# Patient Record
Sex: FEMALE | Race: WHITE | NOT HISPANIC OR LATINO | URBAN - METROPOLITAN AREA
[De-identification: names, ages, dates, MRNs, and addresses within clinical notes are randomized per-mention and may not be internally consistent; named-entity substitution may affect disease eponyms.]

---

## 2024-08-29 ENCOUNTER — LAB (OUTPATIENT)
Dept: LAB | Facility: LAB | Age: 39
End: 2024-08-29
Payer: COMMERCIAL

## 2024-08-29 ENCOUNTER — INITIAL PRENATAL (OUTPATIENT)
Dept: OBSTETRICS AND GYNECOLOGY | Facility: HOSPITAL | Age: 39
End: 2024-08-29
Payer: COMMERCIAL

## 2024-08-29 VITALS — WEIGHT: 151.4 LBS | SYSTOLIC BLOOD PRESSURE: 108 MMHG | BODY MASS INDEX: 21.72 KG/M2 | DIASTOLIC BLOOD PRESSURE: 68 MMHG

## 2024-08-29 DIAGNOSIS — Z34.81 PRENATAL CARE, SUBSEQUENT PREGNANCY IN FIRST TRIMESTER (HHS-HCC): ICD-10-CM

## 2024-08-29 DIAGNOSIS — Z12.4 CERVICAL CANCER SCREENING: ICD-10-CM

## 2024-08-29 DIAGNOSIS — Z12.4 CERVICAL CANCER SCREENING: Primary | ICD-10-CM

## 2024-08-29 DIAGNOSIS — Z3A.01 7 WEEKS GESTATION OF PREGNANCY (HHS-HCC): ICD-10-CM

## 2024-08-29 DIAGNOSIS — O21.9 NAUSEA AND VOMITING IN PREGNANCY PRIOR TO 22 WEEKS GESTATION (HHS-HCC): ICD-10-CM

## 2024-08-29 DIAGNOSIS — Z34.81 PRENATAL CARE, SUBSEQUENT PREGNANCY IN FIRST TRIMESTER (HHS-HCC): Primary | ICD-10-CM

## 2024-08-29 PROBLEM — Z78.9 NOT IMMUNE TO RUBELLA: Status: ACTIVE | Noted: 2024-08-29

## 2024-08-29 PROBLEM — O09.521 MULTIGRAVIDA OF ADVANCED MATERNAL AGE IN FIRST TRIMESTER (HHS-HCC): Status: ACTIVE | Noted: 2024-08-29

## 2024-08-29 PROBLEM — Z82.79 FAMILY HISTORY OF CONGENITAL HEART DEFECT: Status: ACTIVE | Noted: 2024-08-29

## 2024-08-29 LAB
ABO GROUP (TYPE) IN BLOOD: NORMAL
ANTIBODY SCREEN: NORMAL
ERYTHROCYTE [DISTWIDTH] IN BLOOD BY AUTOMATED COUNT: 12.4 % (ref 11.5–14.5)
EST. AVERAGE GLUCOSE BLD GHB EST-MCNC: 82 MG/DL
HBA1C MFR BLD: 4.5 %
HBV SURFACE AG SERPL QL IA: NONREACTIVE
HCT VFR BLD AUTO: 38.3 % (ref 36–46)
HCV AB SER QL: NONREACTIVE
HGB BLD-MCNC: 12.8 G/DL (ref 12–16)
HIV 1+2 AB+HIV1 P24 AG SERPL QL IA: NONREACTIVE
MCH RBC QN AUTO: 31.4 PG (ref 26–34)
MCHC RBC AUTO-ENTMCNC: 33.4 G/DL (ref 32–36)
MCV RBC AUTO: 94 FL (ref 80–100)
NRBC BLD-RTO: 0 /100 WBCS (ref 0–0)
PLATELET # BLD AUTO: 240 X10*3/UL (ref 150–450)
PREGNANCY TEST URINE, POC: POSITIVE
RBC # BLD AUTO: 4.07 X10*6/UL (ref 4–5.2)
REFLEX ADDED, ANEMIA PANEL: NORMAL
RH FACTOR (ANTIGEN D): NORMAL
RUBV IGG SERPL IA-ACNC: 0.4 IA
RUBV IGG SERPL QL IA: NEGATIVE
TREPONEMA PALLIDUM IGG+IGM AB [PRESENCE] IN SERUM OR PLASMA BY IMMUNOASSAY: NONREACTIVE
WBC # BLD AUTO: 7.7 X10*3/UL (ref 4.4–11.3)

## 2024-08-29 PROCEDURE — 83020 HEMOGLOBIN ELECTROPHORESIS: CPT

## 2024-08-29 PROCEDURE — 86317 IMMUNOASSAY INFECTIOUS AGENT: CPT

## 2024-08-29 PROCEDURE — 87340 HEPATITIS B SURFACE AG IA: CPT

## 2024-08-29 PROCEDURE — 87491 CHLMYD TRACH DNA AMP PROBE: CPT

## 2024-08-29 PROCEDURE — 83021 HEMOGLOBIN CHROMOTOGRAPHY: CPT

## 2024-08-29 PROCEDURE — 86901 BLOOD TYPING SEROLOGIC RH(D): CPT

## 2024-08-29 PROCEDURE — 86803 HEPATITIS C AB TEST: CPT

## 2024-08-29 PROCEDURE — 85027 COMPLETE CBC AUTOMATED: CPT

## 2024-08-29 PROCEDURE — 87389 HIV-1 AG W/HIV-1&-2 AB AG IA: CPT

## 2024-08-29 PROCEDURE — 87661 TRICHOMONAS VAGINALIS AMPLIF: CPT

## 2024-08-29 PROCEDURE — 87086 URINE CULTURE/COLONY COUNT: CPT

## 2024-08-29 PROCEDURE — 36415 COLL VENOUS BLD VENIPUNCTURE: CPT

## 2024-08-29 PROCEDURE — 86900 BLOOD TYPING SEROLOGIC ABO: CPT

## 2024-08-29 PROCEDURE — 86850 RBC ANTIBODY SCREEN: CPT

## 2024-08-29 PROCEDURE — 86780 TREPONEMA PALLIDUM: CPT

## 2024-08-29 PROCEDURE — 83036 HEMOGLOBIN GLYCOSYLATED A1C: CPT

## 2024-08-29 PROCEDURE — 81025 URINE PREGNANCY TEST: CPT

## 2024-08-29 PROCEDURE — 0500F INITIAL PRENATAL CARE VISIT: CPT

## 2024-08-29 SDOH — ECONOMIC STABILITY: FOOD INSECURITY: WITHIN THE PAST 12 MONTHS, YOU WORRIED THAT YOUR FOOD WOULD RUN OUT BEFORE YOU GOT MONEY TO BUY MORE.: NEVER TRUE

## 2024-08-29 SDOH — ECONOMIC STABILITY: FOOD INSECURITY: WITHIN THE PAST 12 MONTHS, THE FOOD YOU BOUGHT JUST DIDN'T LAST AND YOU DIDN'T HAVE MONEY TO GET MORE.: NEVER TRUE

## 2024-08-29 SDOH — ECONOMIC STABILITY: TRANSPORTATION INSECURITY
IN THE PAST 12 MONTHS, HAS THE LACK OF TRANSPORTATION KEPT YOU FROM MEDICAL APPOINTMENTS OR FROM GETTING MEDICATIONS?: NO

## 2024-08-29 SDOH — ECONOMIC STABILITY: TRANSPORTATION INSECURITY
IN THE PAST 12 MONTHS, HAS LACK OF TRANSPORTATION KEPT YOU FROM MEETINGS, WORK, OR FROM GETTING THINGS NEEDED FOR DAILY LIVING?: NO

## 2024-08-29 ASSESSMENT — ENCOUNTER SYMPTOMS
OCCASIONAL FEELINGS OF UNSTEADINESS: 0
DEPRESSION: 0
LOSS OF SENSATION IN FEET: 0

## 2024-08-29 ASSESSMENT — EDINBURGH POSTNATAL DEPRESSION SCALE (EPDS)
I HAVE BEEN SO UNHAPPY THAT I HAVE HAD DIFFICULTY SLEEPING: NOT AT ALL
I HAVE BLAMED MYSELF UNNECESSARILY WHEN THINGS WENT WRONG: NO, NEVER
THE THOUGHT OF HARMING MYSELF HAS OCCURRED TO ME: NEVER
I HAVE LOOKED FORWARD WITH ENJOYMENT TO THINGS: AS MUCH AS I EVER DID
I HAVE BEEN SO UNHAPPY THAT I HAVE BEEN CRYING: NO, NEVER
THINGS HAVE BEEN GETTING ON TOP OF ME: NO, I HAVE BEEN COPING AS WELL AS EVER
TOTAL SCORE: 2
I HAVE FELT SAD OR MISERABLE: NO, NOT AT ALL
I HAVE FELT SCARED OR PANICKY FOR NO GOOD REASON: NO, NOT AT ALL
I HAVE BEEN ABLE TO LAUGH AND SEE THE FUNNY SIDE OF THINGS: AS MUCH AS I ALWAYS COULD
I HAVE BEEN ANXIOUS OR WORRIED FOR NO GOOD REASON: YES, SOMETIMES

## 2024-08-29 ASSESSMENT — LIFESTYLE VARIABLES
HOW MANY STANDARD DRINKS CONTAINING ALCOHOL DO YOU HAVE ON A TYPICAL DAY: PATIENT DOES NOT DRINK
SKIP TO QUESTIONS 9-10: 1
HOW OFTEN DO YOU HAVE SIX OR MORE DRINKS ON ONE OCCASION: NEVER
AUDIT-C TOTAL SCORE: 0
HOW OFTEN DO YOU HAVE A DRINK CONTAINING ALCOHOL: NEVER

## 2024-08-29 NOTE — PROGRESS NOTES
"Assessment/Plan     Routine Prenatal Care  - Patient appropriate for and desires midwifery service  - Oriented to practice, including available collaboration and consulting with physicians.  - Discussed routine OB labs, including serum STI/HIV, CBC, blood type and screen.   - Education provided r/t nutrition, folic acid supplementation, dietary guidelines, exercise, travel, smoking, alcohol, caffeine, and drug use.  - Dating ultrasound ordered   - Pt counseled on genetic testing options and provided literature in the obstetric folder. First line screening options, including cffDNA and NT ultrasound, were discussed and offered.  Benefits, drawbacks, and limitations explained. Pt would like both. Orders placed.  - Warning s/s discussed and SAB precautions reviewed. Pt provided office phone number and instructed on when to call.    Nausea During Pregnancy  - Discussed lifestyle modifications including small frequent meals, keeping dry crackers at bedside to eat upon awakening  - Discussed complementary treatments including aubree products and P6 acupressure (\"SeaBands\")  - 25-50mg Vitamin B6 TID (available OTC)  - 12.5mg Unisom nightly at first, then increase to BID if unresolved (available OTC)    Constipation   - Discussed increasing fluid intake, exercise, and fiber in diet. Discussed supplementing dietary fiber with Metamucil 1-3 times daily, and/or using stool softener Colace 50mg BID, PRN.     Pre-pregnancy BMI 21.67   - Normal weight in Pregnancy - BMI 18.5-24.9; weight gain of 25-35lbs through healthy eating habits reviewed     ASA Prophylaxis  - Pt does not meet criteria for ASA prophylaxis..     Health Maintenance  - counseled on seatbelt use, continued bi-annual visits for dental care, and annual visits with PCP  - COVID: vaccinated; most recent booster dose unknown  - Flu: Not yet vaccinated for 3437-2516 season; pt will consider next visit    Cervical Cancer Screening  - PAP due; collected today  - ASCCP " guidelines reviewed with patient; next PAP due in 5 yrs if nml and HPV neg    F/U 4 weeks. Serum NOB labs at next visit. Review dating US at next visit.     TWAN Shetty-SELMA    Subjective     Jennifer Soares is a 39 y.o.  at 7w2d with a working estimated date of delivery of 4/15/2025, by Last Menstrual Period who presents for an initial prenatal visit. This pregnancy is planned.   Partner:  Tod  Employment: Works for nonprofit    Patient currently experiencing: Constipation, breast tenderness    LMP: 24; Prior menstrual cycle regular q 28-30 days.   Bleeding or cramping since LMP: yes - Pt endorses small amount of spotting on Friday, no subsequent bleeding   Taking prenatal vitamin: Yes  Ultrasound completed this pregnancy: No    OB History    Para Term  AB Living   2 1 1         SAB IAB Ectopic Multiple Live Births                  # Outcome Date GA Lbr Norberto/2nd Weight Sex Type Anes PTL Lv   2 Current            1 Term 22   3.629 kg F Vag-Spont EPI       [place]  Depression Scale Total: 2  Prior pregnancy complications:  None    Preeclampsia Risk  Moderate risk factors include: Age =/> 35. High risk factors include: None.    PAP History: last PAP unknown  Pt denies known history of abnormals     No past medical history on file.   History reviewed. No pertinent surgical history.   Social History     Tobacco Use    Smoking status: Former    Smokeless tobacco: Never   Vaping Use    Vaping status: Never Used   Substance Use Topics    Alcohol use: Not Currently    Drug use: Not Currently      Current Medications: PNV    Objective     /68   Wt 68.7 kg (151 lb 6.4 oz)   LMP 2024   BMI 21.72 kg/m²     Physical Exam  Vitals reviewed.   Constitutional:       General: She is not in acute distress.     Appearance: Normal appearance.   HENT:      Head: Normocephalic and atraumatic.   Cardiovascular:      Rate and Rhythm: Normal rate and regular rhythm.       Heart sounds: Normal heart sounds, S1 normal and S2 normal.   Pulmonary:      Effort: Pulmonary effort is normal.      Breath sounds: Normal breath sounds.   Abdominal:      General: Abdomen is flat.      Palpations: Abdomen is soft.      Tenderness: There is no abdominal tenderness.      Comments: Gravid uterus   Genitourinary:     General: Normal vulva.      Exam position: Lithotomy position.      Pubic Area: No rash.       Labia:         Right: No rash or lesion.         Left: No rash or lesion.       Urethra: No prolapse, urethral swelling or urethral lesion.      Vagina: Normal.      Cervix: No cervical motion tenderness, discharge, lesion or cervical bleeding.      Uterus: Enlarged. Not tender.       Comments: Uterine size appropriate for gestational age; cervix visually closed  Musculoskeletal:         General: Normal range of motion.      Cervical back: Normal range of motion.   Skin:     General: Skin is warm and dry.   Neurological:      Mental Status: She is alert and oriented to person, place, and time.   Psychiatric:         Mood and Affect: Mood normal.         Behavior: Behavior normal.         Thought Content: Thought content normal.         Judgment: Judgment normal.         Postpartum Depression: Low Risk  (2024)    [place]  Depression Scale     Last EPDS Total Score: 2     Last EPDS Self Harm Result: Never        Pregnancy Problems (from 24 to present)       Problem Noted Resolved    Family history of congenital heart defect 2024 by ASHANTI Shetty No    Priority:  Medium      Overview Signed 2024  9:29 AM by ASHANTI Shetty     FOB with history of heart defect, had surgery in childhood         7 weeks gestation of pregnancy ([facility]) 2024 by ASHANTI Shetty No    Priority:  Medium      Overview Addendum 2024  9:41 AM by ASHANTI Shetty     Desired provider in labor: [x] CNM  [] Physician  [x] Blood Products: [] Yes,  accepts [] No, needs counseling  [x] Initial BMI: 21.67   [] Prenatal Labs:   [] Cervical Cancer Screening up to date  [] Rh status:   [] Genetic Screening:    [] NT US: (11-13 wks)  [] Baby ASA (if indicated):  [] Pregnancy dated by:     [] Anatomy US: (19-20 wks)  [] Federal Sterilization consent signed (if indicated):  [] 1hr GCT at 24-28wks:  [] Rhogam (if indicated):   [] Fetal Surveillance (if indicated):  [] Tdap (27-32 wks, may be given up to 36 wks if initial window missed):   [] RSV (32-36 wks) (Sept. to end of Jan):   [] Flu Vaccine:    [] Breastfeeding:  [] Postpartum Birth control method:   [] GBS at 36 - 37 wks:  [x] 39 weeks discussion of IOL vs. Expectant management: Expectant management  [x] Mode of delivery ( anticipated ): Vaginal           Multigravida of advanced maternal age in first trimester ([facility]) 8/29/2024 by ASHANTI Shetty No    Priority:  Medium

## 2024-08-30 LAB
BACTERIA UR CULT: NORMAL
C TRACH RRNA SPEC QL NAA+PROBE: NEGATIVE
HEMOGLOBIN A2: 2.8 % (ref 2–3.5)
HEMOGLOBIN A: 96.8 % (ref 95.8–98)
HEMOGLOBIN F: 0.4 % (ref 0–2)
HEMOGLOBIN IDENTIFICATION INTERPRETATION: NORMAL
N GONORRHOEA DNA SPEC QL PROBE+SIG AMP: NEGATIVE
PATH REVIEW-HGB IDENTIFICATION: NORMAL
T VAGINALIS RRNA SPEC QL NAA+PROBE: NEGATIVE

## 2024-09-09 ENCOUNTER — TELEPHONE (OUTPATIENT)
Dept: OBSTETRICS AND GYNECOLOGY | Facility: CLINIC | Age: 39
End: 2024-09-09
Payer: COMMERCIAL

## 2024-09-09 NOTE — TELEPHONE ENCOUNTER
Jennifer Soares has an appointment as a transfer OB on 10/2/2024. Patient stated that she has been bleeding again and wanted to know if she could be seen sooner. Please advise.    Renetta Andrews MA

## 2024-09-13 ENCOUNTER — HOSPITAL ENCOUNTER (OUTPATIENT)
Dept: RADIOLOGY | Facility: CLINIC | Age: 39
Discharge: HOME | End: 2024-09-13
Payer: COMMERCIAL

## 2024-09-13 DIAGNOSIS — Z3A.01 7 WEEKS GESTATION OF PREGNANCY (HHS-HCC): ICD-10-CM

## 2024-09-13 DIAGNOSIS — Z34.81 PRENATAL CARE, SUBSEQUENT PREGNANCY IN FIRST TRIMESTER (HHS-HCC): ICD-10-CM

## 2024-09-13 DIAGNOSIS — Z12.4 CERVICAL CANCER SCREENING: ICD-10-CM

## 2024-09-13 PROCEDURE — 76801 OB US < 14 WKS SINGLE FETUS: CPT

## 2024-09-17 ENCOUNTER — TELEPHONE (OUTPATIENT)
Dept: OBSTETRICS AND GYNECOLOGY | Facility: CLINIC | Age: 39
End: 2024-09-17
Payer: COMMERCIAL

## 2024-09-17 NOTE — TELEPHONE ENCOUNTER
Patient called wanted to know if she could move her 10/2 appt to 10/16 at 915. She said something came up and need to change.Please advise next step.    Carisa Dickerson CNA

## 2024-09-27 ENCOUNTER — APPOINTMENT (OUTPATIENT)
Dept: RADIOLOGY | Facility: HOSPITAL | Age: 39
End: 2024-09-27
Payer: COMMERCIAL

## 2024-09-30 ENCOUNTER — APPOINTMENT (OUTPATIENT)
Dept: OBSTETRICS AND GYNECOLOGY | Facility: CLINIC | Age: 39
End: 2024-09-30
Payer: COMMERCIAL

## 2024-10-01 ENCOUNTER — APPOINTMENT (OUTPATIENT)
Dept: RADIOLOGY | Facility: CLINIC | Age: 39
End: 2024-10-01
Payer: COMMERCIAL

## 2024-10-02 ENCOUNTER — APPOINTMENT (OUTPATIENT)
Dept: OBSTETRICS AND GYNECOLOGY | Facility: CLINIC | Age: 39
End: 2024-10-02
Payer: COMMERCIAL

## 2024-10-09 ENCOUNTER — HOSPITAL ENCOUNTER (OUTPATIENT)
Dept: RADIOLOGY | Facility: CLINIC | Age: 39
Discharge: HOME | End: 2024-10-09
Payer: COMMERCIAL

## 2024-10-09 DIAGNOSIS — Z3A.01 7 WEEKS GESTATION OF PREGNANCY (HHS-HCC): ICD-10-CM

## 2024-10-09 DIAGNOSIS — Z34.81 PRENATAL CARE, SUBSEQUENT PREGNANCY IN FIRST TRIMESTER (HHS-HCC): ICD-10-CM

## 2024-10-09 DIAGNOSIS — Z12.4 CERVICAL CANCER SCREENING: ICD-10-CM

## 2024-10-09 PROCEDURE — 76813 OB US NUCHAL MEAS 1 GEST: CPT

## 2024-10-09 PROCEDURE — 76813 OB US NUCHAL MEAS 1 GEST: CPT | Performed by: STUDENT IN AN ORGANIZED HEALTH CARE EDUCATION/TRAINING PROGRAM

## 2024-10-15 PROBLEM — Z3A.14 14 WEEKS GESTATION OF PREGNANCY (HHS-HCC): Status: ACTIVE | Noted: 2024-08-29

## 2024-10-15 PROBLEM — O09.529 ANTEPARTUM MULTIGRAVIDA OF ADVANCED MATERNAL AGE (HHS-HCC): Status: ACTIVE | Noted: 2024-08-29

## 2024-10-15 NOTE — PROGRESS NOTES
Ob Follow-up    SUBJECTIVE      HPI: Jennifer Soares is a 39 y.o.  at 14w1d here for RPNV.  She denies vaginal bleeding, leakage of fluid, decreased fetal movements, and contractions.       OBJECTIVE  Visit Vitals  /67   Wt 69.9 kg (154 lb)   LMP 2024   BMI 22.10 kg/m²   OB Status Pregnant   Smoking Status Former   BSA 1.86 m²      See OB flowsheet      ASSESSMENT & PLAN    Jennifer Soares is a 39 y.o.  at 14w1d here for the following concerns we addressed today:    Problem List Items Addressed This Visit          Ob-Gyn Problems    Antepartum multigravida of advanced maternal age ([facility]) - Primary    Overview     NT wnl           Relevant Orders    Myriad Prequel Prenatal Screen    14 weeks gestation of pregnancy ([facility])    Overview     [x] Blood Products: [x] Yes, accepts [] No, needs counseling  [x] Initial BMI: 21.67   [x] Prenatal Labs: wnl except for rubella non immune status  [x] Cervical Cancer Screening up to date: next due 2029  [x] Rh status: positive  [] Genetic Screening:  cfDNA screen  [x] It's a surprise!  [x] NT US: (11-13 wks): 1.7mm  [x] Baby ASA:  Yes  [x] Pregnancy dated by: LMP = 9w3d scan    [] Anatomy US: (19-20 wks)  [] 1hr GCT at 24-28wks:  [] Fetal Surveillance (if indicated):  [] Tdap (27-32 wks, may be given up to 36 wks if initial window missed):   [] RSV (32-36 wks) (Sept. to end of ):   [x] Flu Vaccine: Done    [] Breastfeeding:  [] Postpartum Birth control method:   [] GBS at 36 - 37 wks:  [x] 39 weeks discussion of IOL vs. Expectant management: Hoping for spontaneous labor  [x] Mode of delivery ( anticipated ): Vaginal           Relevant Orders    Flu vaccine, trivalent, preservative free, age 6 months and greater (Fluarix/Fluzone/Flulaval)       Other    Not immune to rubella    Overview     Offer MMR postpartum         Family history of congenital heart defect    Overview     FOB with history of heart defect, had surgery in childhood              Orders  Placed This Encounter   Procedures    Flu vaccine, trivalent, preservative free, age 6 months and greater (Fluarix/Fluzone/Flulaval)    Myriad Prequel Prenatal Screen     Standing Status:   Future     Number of Occurrences:   1     Standing Expiration Date:   10/16/2025     Order Specific Question:   Patient Ethnicity     Answer:   Other/Mixed Caucasion     Order Specific Question:   Pregnant     Answer:   Yes     Order Specific Question:   Due Date     Answer:   4/15/2025     Order Specific Question:   First Pregnancy     Answer:   Yes     Order Specific Question:   Pregnancy type     Answer:   Fritz (or unknown)     Order Specific Question:   Common aneuploidy, chromosome 13, 18, 21 (Z36.0)     Answer:   Yes     Order Specific Question:   Include sex chromosome analysis     Answer:   No     Order Specific Question:   Microdeletions, fritz only     Answer:   No     Order Specific Question:   Expanded aneuploidy, fritz only     Answer:   No     Order Specific Question:   Clinical indications     Answer:   Advanced maternal age, Not 1st pregnancy O09.521, OO09.522, O09.523, O09.529     Order Specific Question:   Billing Information     Answer:   Bill to insurance - If possible, attach a copy of the patient's insurance card     Order Specific Question:   Relationship to Policy Owner     Answer:   Self     Order Specific Question:   Patient e-mail address     Answer:   [email or web address]     Order Specific Question:   Patient's phone number     Answer:   [phone]     Order Specific Question:   Authorized Representative     Answer:   By providing the below contact, I confirm that the patient has expressly consented to [employer] sharing the patients protected health information, including screening results and billing information, with the person listed upon request.      Has not had cfDNA screening done yet and DOES NOT want to know fetal sex(order put in by [facility] says YES to fetal sex).  New order placed  and will try to delete that order.  Anatomy US scheduled  RTC in 4 weeks      Jhony Rodriguez MD

## 2024-10-16 ENCOUNTER — APPOINTMENT (OUTPATIENT)
Dept: OBSTETRICS AND GYNECOLOGY | Facility: CLINIC | Age: 39
End: 2024-10-16
Payer: COMMERCIAL

## 2024-10-16 ENCOUNTER — APPOINTMENT (OUTPATIENT)
Dept: LAB | Facility: LAB | Age: 39
End: 2024-10-16
Payer: COMMERCIAL

## 2024-10-16 VITALS — DIASTOLIC BLOOD PRESSURE: 67 MMHG | BODY MASS INDEX: 22.1 KG/M2 | SYSTOLIC BLOOD PRESSURE: 100 MMHG | WEIGHT: 154 LBS

## 2024-10-16 DIAGNOSIS — Z3A.14 14 WEEKS GESTATION OF PREGNANCY (HHS-HCC): ICD-10-CM

## 2024-10-16 DIAGNOSIS — O09.529 ANTEPARTUM MULTIGRAVIDA OF ADVANCED MATERNAL AGE (HHS-HCC): Primary | ICD-10-CM

## 2024-10-16 DIAGNOSIS — Z78.9 NOT IMMUNE TO RUBELLA: ICD-10-CM

## 2024-10-16 DIAGNOSIS — Z82.79 FAMILY HISTORY OF CONGENITAL HEART DEFECT: ICD-10-CM

## 2024-10-30 LAB — COMMENTS - MP RESULT TYPE: NORMAL

## 2024-11-12 PROBLEM — Z3A.18 18 WEEKS GESTATION OF PREGNANCY (HHS-HCC): Status: ACTIVE | Noted: 2024-08-29

## 2024-11-13 NOTE — PROGRESS NOTES
Ob Follow-up    SUBJECTIVE      HPI: Jennifer Soares is a 39 y.o.  at 18w2d here for RPNV.  She denies vaginal bleeding, leakage of fluid, decreased fetal movements, and contractions.       OBJECTIVE  Visit Vitals  /70   Wt 71.2 kg (157 lb)   LMP 2024   BMI 22.53 kg/m²   OB Status Pregnant   Smoking Status Former   BSA 1.88 m²      See OB flowsheet      ASSESSMENT & PLAN    Jennifer Soares is a 39 y.o.  at 18w2d here for the following concerns we addressed today:    Problem List Items Addressed This Visit          Ob-Gyn Problems    [facility] - [facility]    Overview     NT wnl           18 weeks gestation of pregnancy ([facility])    Overview     [x] Blood Products: [x] Yes, accepts [] No, needs counseling  [x] Initial BMI: 21.67   [x] Prenatal Labs: wnl except for rubella non immune status  [x] Cervical Cancer Screening up to date: next due 2029  [x] Rh status: positive  [x] Genetic Screening:  rrcfDNA screen  [x] It's a surprise!  [x] NT US: (11-13 wks): 1.7mm  [x] Baby ASA:  Yes  [x] Pregnancy dated by: LMP = 9w3d scan    [] Anatomy US: (19-20 wks)  [] 1hr GCT at 24-28wks:  [] Fetal Surveillance (if indicated):  [] Tdap (27-32 wks, may be given up to 36 wks if initial window missed):   [] RSV (32-36 wks) (Sept. to end of ):   [x] Flu Vaccine: Done    [] Breastfeeding:  [] Postpartum Birth control method:   [] GBS at 36 - 37 wks:  [x] 39 weeks discussion of IOL vs. Expectant management: Hoping for spontaneous labor  [x] Mode of delivery ( anticipated ): Vaginal              Other    Not immune to rubella    Overview     Offer MMR postpartum         Family history of congenital heart defect    Overview     FOB with history of heart defect, had surgery in childhood              No orders of the defined types were placed in this encounter.     Anatomy US scheduled  RTC in 4 weeks      Jhony Rodriguez MD

## 2024-11-14 ENCOUNTER — APPOINTMENT (OUTPATIENT)
Dept: OBSTETRICS AND GYNECOLOGY | Facility: CLINIC | Age: 39
End: 2024-11-14
Payer: COMMERCIAL

## 2024-11-14 VITALS — SYSTOLIC BLOOD PRESSURE: 109 MMHG | BODY MASS INDEX: 22.53 KG/M2 | DIASTOLIC BLOOD PRESSURE: 70 MMHG | WEIGHT: 157 LBS

## 2024-11-14 DIAGNOSIS — O09.529 ANTEPARTUM MULTIGRAVIDA OF ADVANCED MATERNAL AGE (HHS-HCC): Primary | ICD-10-CM

## 2024-11-14 DIAGNOSIS — Z82.79 FAMILY HISTORY OF CONGENITAL HEART DEFECT: ICD-10-CM

## 2024-11-14 DIAGNOSIS — Z3A.18 18 WEEKS GESTATION OF PREGNANCY (HHS-HCC): ICD-10-CM

## 2024-11-14 DIAGNOSIS — Z78.9 NOT IMMUNE TO RUBELLA: ICD-10-CM

## 2024-11-14 PROCEDURE — 0501F PRENATAL FLOW SHEET: CPT | Performed by: OBSTETRICS & GYNECOLOGY

## 2024-11-19 ENCOUNTER — HOSPITAL ENCOUNTER (OUTPATIENT)
Dept: RADIOLOGY | Facility: CLINIC | Age: 39
Discharge: HOME | End: 2024-11-19
Payer: COMMERCIAL

## 2024-11-19 DIAGNOSIS — O09.529 AMA (ADVANCED MATERNAL AGE) MULTIGRAVIDA 35+ (HHS-HCC): ICD-10-CM

## 2024-11-19 DIAGNOSIS — Z3A.01 7 WEEKS GESTATION OF PREGNANCY (HHS-HCC): ICD-10-CM

## 2024-11-19 DIAGNOSIS — Z12.4 CERVICAL CANCER SCREENING: ICD-10-CM

## 2024-11-19 DIAGNOSIS — Z34.81 PRENATAL CARE, SUBSEQUENT PREGNANCY IN FIRST TRIMESTER (HHS-HCC): ICD-10-CM

## 2024-11-19 PROBLEM — Q27.0 SINGLE UMBILICAL ARTERY (HHS-HCC): Status: ACTIVE | Noted: 2024-11-19

## 2024-11-19 PROCEDURE — 76811 OB US DETAILED SNGL FETUS: CPT | Performed by: STUDENT IN AN ORGANIZED HEALTH CARE EDUCATION/TRAINING PROGRAM

## 2024-11-19 PROCEDURE — 76811 OB US DETAILED SNGL FETUS: CPT

## 2024-12-10 PROBLEM — Z3A.22 22 WEEKS GESTATION OF PREGNANCY (HHS-HCC): Status: ACTIVE | Noted: 2024-08-29

## 2024-12-10 NOTE — PROGRESS NOTES
Ob Follow-up    SUBJECTIVE      HPI: Jennifer Soares is a 39 y.o.  at 22w2d here for RPNV.  She denies vaginal bleeding, leakage of fluid, decreased fetal movements, and contractions.       OBJECTIVE  Visit Vitals  BP 98/68   Wt 73.9 kg (163 lb)   LMP 2024   BMI 23.39 kg/m²   OB Status Pregnant   Smoking Status Former   BSA 1.91 m²      See OB flowsheet      ASSESSMENT & PLAN    Jennifer Soares is a 39 y.o.  at 22w2d here for the following concerns we addressed today:    Problem List Items Addressed This Visit          Ob-Gyn Problems    [facility] - Primary    Overview     US for growth:  30w  36w         Antepartum multigravida of advanced maternal age ([facility])    Overview     NT wnl           22 weeks gestation of pregnancy ([facility])    Overview     [x] Blood Products: [x] Yes, accepts [] No, needs counseling  [x] Initial BMI: 21.67   [x] Prenatal Labs: wnl except for rubella non immune status  [x] Cervical Cancer Screening up to date: next due 2029  [x] Rh status: positive  [x] Genetic Screening:  rrcfDNA screen  [x] It's a surprise!  [x] NT US: (11-13 wks): 1.7mm  [x] Baby ASA:  Yes  [x] Pregnancy dated by: LMP = 9w3d scan    [x] Anatomy US: (19-20 wks) SUA otherwise normal  [] 1hr GCT at 24-28wks:  [] Fetal Surveillance (if indicated):  [] Tdap (27-32 wks, may be given up to 36 wks if initial window missed):   [] RSV (32-36 wks) (Sept. to end of ):   [x] Flu Vaccine: Done    [] Breastfeeding:  [] Postpartum Birth control method:   [] GBS at 36 - 37 wks:  [x] 39 weeks discussion of IOL vs. Expectant management: Hoping for spontaneous labor  [x] Mode of delivery ( anticipated ): Vaginal           Relevant Orders    Syphilis Screen with Reflex    CBC Anemia Panel With Reflex,Pregnancy    Glucose, 1 Hour Screen, Pregnancy       Other    Not immune to rubella    Overview     Offer MMR postpartum         Family history of congenital heart defect    Overview     FOB with  history of heart defect, had surgery in childhood  Fetal echo scheduled 12/26               GCT next visit  RTC in 4 weeks      Jhony Rodriguez MD

## 2024-12-12 ENCOUNTER — APPOINTMENT (OUTPATIENT)
Dept: OBSTETRICS AND GYNECOLOGY | Facility: CLINIC | Age: 39
End: 2024-12-12
Payer: COMMERCIAL

## 2024-12-12 VITALS — SYSTOLIC BLOOD PRESSURE: 98 MMHG | WEIGHT: 163 LBS | BODY MASS INDEX: 23.39 KG/M2 | DIASTOLIC BLOOD PRESSURE: 68 MMHG

## 2024-12-12 DIAGNOSIS — Z78.9 NOT IMMUNE TO RUBELLA: ICD-10-CM

## 2024-12-12 DIAGNOSIS — Q27.0 SINGLE UMBILICAL ARTERY (HHS-HCC): Primary | ICD-10-CM

## 2024-12-12 DIAGNOSIS — Z3A.22 22 WEEKS GESTATION OF PREGNANCY (HHS-HCC): ICD-10-CM

## 2024-12-12 DIAGNOSIS — O09.529 ANTEPARTUM MULTIGRAVIDA OF ADVANCED MATERNAL AGE (HHS-HCC): ICD-10-CM

## 2024-12-12 DIAGNOSIS — Z82.79 FAMILY HISTORY OF CONGENITAL HEART DEFECT: ICD-10-CM

## 2024-12-12 PROCEDURE — 0501F PRENATAL FLOW SHEET: CPT | Performed by: OBSTETRICS & GYNECOLOGY

## 2024-12-26 ENCOUNTER — APPOINTMENT (OUTPATIENT)
Dept: PEDIATRIC CARDIOLOGY | Facility: CLINIC | Age: 39
End: 2024-12-26
Payer: COMMERCIAL

## 2024-12-26 VITALS
OXYGEN SATURATION: 98 % | SYSTOLIC BLOOD PRESSURE: 92 MMHG | BODY MASS INDEX: 22.87 KG/M2 | WEIGHT: 163.36 LBS | HEIGHT: 71 IN | HEART RATE: 86 BPM | DIASTOLIC BLOOD PRESSURE: 64 MMHG

## 2024-12-26 DIAGNOSIS — O35.BXX0 ABNORMAL FETAL ECHOCARDIOGRAPHY AFFECTING ANTEPARTUM CARE OF MOTHER, SINGLE OR UNSPECIFIED FETUS (HHS-HCC): Primary | ICD-10-CM

## 2024-12-26 DIAGNOSIS — O35.8XX0 MATERNAL CARE FOR OTHER (SUSPECTED) FETAL ABNORMALITY AND DAMAGE, NOT APPLICABLE OR UNSPECIFIED (HHS-HCC): ICD-10-CM

## 2024-12-26 DIAGNOSIS — O28.3 ABNORMAL FETAL ULTRASOUND: ICD-10-CM

## 2024-12-26 LAB — BODY SURFACE AREA: 1.93 M2

## 2024-12-26 PROCEDURE — 76825 ECHO EXAM OF FETAL HEART: CPT | Performed by: PEDIATRICS

## 2024-12-26 PROCEDURE — 1036F TOBACCO NON-USER: CPT | Performed by: PEDIATRICS

## 2024-12-26 PROCEDURE — 76827 ECHO EXAM OF FETAL HEART: CPT | Performed by: PEDIATRICS

## 2024-12-26 PROCEDURE — 99204 OFFICE O/P NEW MOD 45 MIN: CPT | Performed by: PEDIATRICS

## 2024-12-26 PROCEDURE — 3008F BODY MASS INDEX DOCD: CPT | Performed by: PEDIATRICS

## 2024-12-26 PROCEDURE — 93325 DOPPLER ECHO COLOR FLOW MAPG: CPT | Performed by: PEDIATRICS

## 2024-12-26 NOTE — PROGRESS NOTES
Jennifer Soares was seen at the request of Ina Dang for a chief complaint of family history of congenital heart disease; a report with my findings is being sent via written or electronic means the referring physician with my recommendations for treatment.     I had the pleasure of seeing Jennifer Soares in Pediatric Cardiology consultation at our [facility] location as part of our prenatal heart program for family history of congenital heart disease. Her  has a history of coarctation of the aorta and a bicuspid aortic valve. She is a 39 y.o. year-old  woman, currently 24w2d weeks gestation.  Her last menstrual period was Patient's last menstrual period was 2024..  Estimated date of delivery is Estimated Date of Delivery: 4/15/25.  There have been no pregnancy complications.   She has not been hospitalized during this pregnancy.  She had a NIPT, which was normal.  She had a second trimester ultrasound which demonstrated a single umbilical artery.      Prior to the visit, I personally reviewed the cardiac portions of the obstetrical ultrasound performed on 2024.  There is normal segmental anatomy with normal 4 chamber, outflow tract and 3 vessel views.      Her previous obstetrical history is significant for one term delivery.  Her past medical history is without complication.  She has no history of congenital heart disease, arrhythmia, cardiomyopathy, hypercholesterolemia, hypertension, diabetes, rheumatic heart disease, cancer, asthma, lupus, Sjogren syndrome, clotting disorder, depression, anxiety, alcohol abuse, phenylketonuria, or DiGeorge.  She has had no surgeries.  She is not taking any medications.  She has is allergic to penicillins..  She is currently taking prenatal vitamins.      Her family history is negative for congenital heart disease, early atherosclerosis, sudden cardiac death, long QT syndrome, cardiomyopathy, aortic aneurysm, or genetic or metabolic disease.    Her  " has a history of coarctation of the aorta and bicuspid aortic valve. He underwent surgical repair at 9 days old and balloon angioplasty at age 10.     She currently lives with her spouse and is .  She is not employed.  She does not smoke.  She denies illicit drug use or alcohol abuse.  She denies verbal, sexual, or physical abuse.     Delivery Hospital: [facility]  Father of the baby's name: Asad     BP 92/64 (BP Location: Left arm, Patient Position: Sitting, BP Cuff Size: Adult)   Pulse 86   Ht 1.81 m (5' 11.26\")   Wt 74.1 kg (163 lb 5.8 oz)   LMP 2024   SpO2 98%   BMI 22.62 kg/m²     She was resting comfortably in the examination room and alert, active and in no respiratory distress. Skin was without rash.  HEENT: moist mucous membranes, no JVD, goiter. Breathing is not labored.  She was acyanotic.  There was no peripheral edema.   The abdomen was gravid, soft, nontender with normal bowel sounds.  The liver was not palpable.  The spleen tip was not palpable.  She had a normal gait and normal strength in all extremities.  Cranial nerves II - XII are intact.  She had no clubbing, cyanosis, or edema.    A two-dimensional and Doppler fetal echocardiogram was performed today and interpreted by me at 24w2d weeks gestation.  The fetal echocardiogram showed normal segmental anatomy with no structural abnormalities found.  There is normal cardiac function.  There is no evidence of septation defect, right or left ventricular outflow obstruction or significant valvular regurgitation.  The fetal heart rate was within normal limits without ectopy or arrhythmia seen.  The spectral Doppler pattern across all valves, venous structures, and arterial structures was within normal limits.  There is no pericardial effusion.  Please see full report for details.    In summary, Jennifer Soares is a 39 y.o. year-old  woman, currently 24w2d weeks gestation, who had a normal " fetal echocardiogram at today's visit.  Therefore, we did not make any changes to her current delivery plan.  We did not prescribe any medications.  We did not recommend intervention.  She does not necessarily need to follow up with pediatric cardiology after the baby is born unless the pediatric team has any concerns or worries.     I do recommend that all first degree relatives of individuals with a bicuspid aortic valve have a screening echocardiogram.  This can be performed around 5 years of age.    LOC 0  No changes were made to current delivery plan. Triage code 0:  Delivery per OB at patient's preferred hospital.  Standard  care per  team.  Cardiology consult not necessary, unless there are clinical concerns.      Thank you for allowing me to participate in Jennifer's care.  If you have any further questions, please do not hesitate to contact me.     Demetri Jackson M.D.  [facility], Director  Ambulatory Pediatric Cardiology   Division of Pediatric Cardiology  [facility]  [facility]   of Pediatrics, [facility] [employer]  [facility] [address] [place] [address]  [place] [id number]  Office:  [phone]  Fax:       [phone]  e-mail:  [email or web address]    I spent greater than 45 minutes in performance of this consultation, of which greater than 50% was related to coordination of care or counseling.

## 2024-12-26 NOTE — LETTER
2024     Ina Dang MD  [id number]  Department Of Ob/Gyn-General Ob/Gyn  [facility] [address]    Patient: Jennifer Soares   YOB: 1985   Date of Visit: 2024       Dear Dr. Ina Dang MD:    Thank you for referring Jennifer Soares to me for evaluation. Below are my notes for this consultation.  If you have questions, please do not hesitate to call me. I look forward to following your patient along with you.       Sincerely,     Demetri Jackson MD      CC: MD Charissa Green, APRN-CN, [facility]  Eva Titus, APRN-CNP  ______________________________________________________________________________________    Jennifer Soares was seen at the request of Ina Dang for a chief complaint of family history of congenital heart disease; a report with my findings is being sent via written or electronic means the referring physician with my recommendations for treatment.     I had the pleasure of seeing Jennifer Soares in Pediatric Cardiology consultation at our [facility] location as part of our prenatal heart program for family history of congenital heart disease. Her  has a history of coarctation of the aorta and a bicuspid aortic valve. She is a 39 y.o. year-old  woman, currently 24w2d weeks gestation.  Her last menstrual period was Patient's last menstrual period was 2024..  Estimated date of delivery is Estimated Date of Delivery: 4/15/25.  There have been no pregnancy complications.   She has not been hospitalized during this pregnancy.  She had a NIPT, which was normal.  She had a second trimester ultrasound which demonstrated a single umbilical artery.      Prior to the visit, I personally reviewed the cardiac portions of the obstetrical ultrasound performed on 2024.  There is normal segmental anatomy with normal 4 chamber, outflow tract and 3 vessel views.      Her previous obstetrical history is significant for one term delivery.  " Her past medical history is without complication.  She has no history of congenital heart disease, arrhythmia, cardiomyopathy, hypercholesterolemia, hypertension, diabetes, rheumatic heart disease, cancer, asthma, lupus, Sjogren syndrome, clotting disorder, depression, anxiety, alcohol abuse, phenylketonuria, or DiGeorge.  She has had no surgeries.  She is not taking any medications.  She has is allergic to penicillins..  She is currently taking prenatal vitamins.      Her family history is negative for congenital heart disease, early atherosclerosis, sudden cardiac death, long QT syndrome, cardiomyopathy, aortic aneurysm, or genetic or metabolic disease.    Her  has a history of coarctation of the aorta and bicuspid aortic valve. He underwent surgical repair at 9 days old and balloon angioplasty at age 10.     She currently lives with her spouse and is .  She is not employed.  She does not smoke.  She denies illicit drug use or alcohol abuse.  She denies verbal, sexual, or physical abuse.     Delivery Hospital: [facility]  Father of the baby's name: Asad     BP 92/64 (BP Location: Left arm, Patient Position: Sitting, BP Cuff Size: Adult)   Pulse 86   Ht 1.81 m (5' 11.26\")   Wt 74.1 kg (163 lb 5.8 oz)   LMP 07/09/2024   SpO2 98%   BMI 22.62 kg/m²     She was resting comfortably in the examination room and alert, active and in no respiratory distress. Skin was without rash.  HEENT: moist mucous membranes, no JVD, goiter. Breathing is not labored.  She was acyanotic.  There was no peripheral edema.   The abdomen was gravid, soft, nontender with normal bowel sounds.  The liver was not palpable.  The spleen tip was not palpable.  She had a normal gait and normal strength in all extremities.  Cranial nerves II - XII are intact.  She had no clubbing, cyanosis, or edema.    A two-dimensional and Doppler fetal echocardiogram was performed today and interpreted by me at " 24w2d weeks gestation.  The fetal echocardiogram showed normal segmental anatomy with no structural abnormalities found.  There is normal cardiac function.  There is no evidence of septation defect, right or left ventricular outflow obstruction or significant valvular regurgitation.  The fetal heart rate was within normal limits without ectopy or arrhythmia seen.  The spectral Doppler pattern across all valves, venous structures, and arterial structures was within normal limits.  There is no pericardial effusion.  Please see full report for details.    In summary, Jennifer Soares is a 39 y.o. year-old  woman, currently 24w2d weeks gestation, who had a normal fetal echocardiogram at today's visit.  Therefore, we did not make any changes to her current delivery plan.  We did not prescribe any medications.  We did not recommend intervention.  She does not necessarily need to follow up with pediatric cardiology after the baby is born unless the pediatric team has any concerns or worries.     I do recommend that all first degree relatives of individuals with a bicuspid aortic valve have a screening echocardiogram.  This can be performed around 5 years of age.    LOC 0  No changes were made to current delivery plan. Triage code 0:  Delivery per OB at patient's preferred hospital.  Standard  care per  team.  Cardiology consult not necessary, unless there are clinical concerns.      Thank you for allowing me to participate in Jennifer's care.  If you have any further questions, please do not hesitate to contact me.     Demetri Jackson M.D.  [facility], Director  Ambulatory Pediatric Cardiology   Division of Pediatric Cardiology  [facility]  [facility]   of Pediatrics, [employer]  [facility] [address]  [place]  [address]  Office:  [phone]  Fax:       [phone]  e-mail:  [email or web address]    I spent greater than 45 minutes in performance of this consultation, of which greater than 50% was related to coordination of care or counseling.

## 2024-12-26 NOTE — PATIENT INSTRUCTIONS
The fetal echocardiogram performed today was normal.  The heart is built normally.  The heart function is normal.  The heart beat is normal.  There is no need for changes to your delivery plan.  Your baby does not need to see pediatric cardiology unless your pediatrician has concerns.  Sometimes it is not possible to see all the heart structures because of the position or size of the fetus. This does not mean they are not there, but may mean that for technical reasons they cannot be assessed. Sometimes this information may not be important; while in some cases it means that definite answers are not possible. The echocardiographer will discuss this with you if necessary, and repeat studies are frequently performed later in the pregnancy.     We see bicuspid aortic valves passed on in families.  I do recommend a screening echocardiogram at the age of 5 years.      Certain congenital heart abnormalities are also hard to detect by fetal echocardiograms, but often these are simple abnormalities. We do not mention this to concern you, but rather that you understand that there are technical limitations to such studies. It remains important that your pediatrician provides a normal careful medical examination of the baby (including the heart) takes place after birth, and if there were any suspicious cardiac findings, that these are evaluated in the usual way irrespective of the findings at fetal study.     Certain communications between the two sides of the circulation are normally present in all developing babies and normally close after birth. We are not able to tell in advance whether this will occur, however there is only a tiny chance that they will not. Persistence of these structures is generally not a difficult problem to deal with.     We direct our attention only to the heart, where we have special expertise. This is not the same as your general obstetric ultrasound scan. Other ultrasound information about the  fetus can be obtained from an obstetric ultrasonographer or your obstetrician.

## 2025-01-08 PROBLEM — Z3A.26 26 WEEKS GESTATION OF PREGNANCY (HHS-HCC): Status: ACTIVE | Noted: 2024-08-29

## 2025-01-09 ENCOUNTER — APPOINTMENT (OUTPATIENT)
Dept: OBSTETRICS AND GYNECOLOGY | Facility: CLINIC | Age: 40
End: 2025-01-09
Payer: COMMERCIAL

## 2025-01-09 VITALS — BODY MASS INDEX: 22.98 KG/M2 | DIASTOLIC BLOOD PRESSURE: 61 MMHG | WEIGHT: 166 LBS | SYSTOLIC BLOOD PRESSURE: 97 MMHG

## 2025-01-09 DIAGNOSIS — Z82.79 FAMILY HISTORY OF CONGENITAL HEART DEFECT: ICD-10-CM

## 2025-01-09 DIAGNOSIS — Z78.9 NOT IMMUNE TO RUBELLA: ICD-10-CM

## 2025-01-09 DIAGNOSIS — Z3A.26 26 WEEKS GESTATION OF PREGNANCY (HHS-HCC): ICD-10-CM

## 2025-01-09 DIAGNOSIS — Q27.0 SINGLE UMBILICAL ARTERY (HHS-HCC): Primary | ICD-10-CM

## 2025-01-09 DIAGNOSIS — K21.9 GASTROESOPHAGEAL REFLUX DISEASE WITHOUT ESOPHAGITIS: ICD-10-CM

## 2025-01-09 DIAGNOSIS — O09.529 ANTEPARTUM MULTIGRAVIDA OF ADVANCED MATERNAL AGE (HHS-HCC): ICD-10-CM

## 2025-01-09 PROCEDURE — 82947 ASSAY GLUCOSE BLOOD QUANT: CPT | Mod: PORLAB | Performed by: OBSTETRICS & GYNECOLOGY

## 2025-01-09 PROCEDURE — 36415 COLL VENOUS BLD VENIPUNCTURE: CPT | Performed by: OBSTETRICS & GYNECOLOGY

## 2025-01-09 PROCEDURE — 0501F PRENATAL FLOW SHEET: CPT | Performed by: OBSTETRICS & GYNECOLOGY

## 2025-01-09 PROCEDURE — 85027 COMPLETE CBC AUTOMATED: CPT | Mod: PORLAB | Performed by: OBSTETRICS & GYNECOLOGY

## 2025-01-09 PROCEDURE — 86780 TREPONEMA PALLIDUM: CPT | Performed by: OBSTETRICS & GYNECOLOGY

## 2025-01-09 RX ORDER — OMEPRAZOLE 20 MG/1
20 CAPSULE, DELAYED RELEASE ORAL DAILY
Qty: 90 CAPSULE | Refills: 3 | Status: SHIPPED | OUTPATIENT
Start: 2025-01-09 | End: 2026-01-09

## 2025-01-09 ASSESSMENT — EDINBURGH POSTNATAL DEPRESSION SCALE (EPDS)
I HAVE FELT SAD OR MISERABLE: NO, NOT AT ALL
TOTAL SCORE: 0
THE THOUGHT OF HARMING MYSELF HAS OCCURRED TO ME: NEVER
I HAVE LOOKED FORWARD WITH ENJOYMENT TO THINGS: AS MUCH AS I EVER DID
I HAVE BLAMED MYSELF UNNECESSARILY WHEN THINGS WENT WRONG: NO, NEVER
I HAVE BEEN ANXIOUS OR WORRIED FOR NO GOOD REASON: NO, NOT AT ALL
I HAVE FELT SCARED OR PANICKY FOR NO GOOD REASON: NO, NOT AT ALL
THINGS HAVE BEEN GETTING ON TOP OF ME: NO, I HAVE BEEN COPING AS WELL AS EVER
I HAVE BEEN SO UNHAPPY THAT I HAVE HAD DIFFICULTY SLEEPING: NOT AT ALL
I HAVE BEEN ABLE TO LAUGH AND SEE THE FUNNY SIDE OF THINGS: AS MUCH AS I ALWAYS COULD
I HAVE BEEN SO UNHAPPY THAT I HAVE BEEN CRYING: NO, NEVER

## 2025-01-09 NOTE — PROGRESS NOTES
Ob Follow-up    SUBJECTIVE      HPI: Jennifer Soares is a 39 y.o.  at 26w2d here for RPNV.  She denies vaginal bleeding, leakage of fluid, decreased fetal movements, and contractions.    OBJECTIVE  Visit Vitals  BP 97/61   Wt 75.3 kg (166 lb)   LMP 2024   BMI 22.98 kg/m²   OB Status Pregnant   Smoking Status Former   BSA 1.95 m²      See OB flowsheet      ASSESSMENT & PLAN    Jennifer Soares is a 39 y.o.  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  [x] NT US: (11-13 wks): 1.7mm  [x] Baby ASA:  Yes  [x] Pregnancy dated by: LMP = 9w3d scan    [x] Anatomy US: (19-20 wks) SUA otherwise normal  [] 1hr GCT at 24-28wks:  [] Fetal Surveillance (if indicated):  [] Tdap (27-32 wks, may be given up to 36 wks if initial window missed):   [] RSV (32-36 wks) (Sept. to end of ):   [x] Flu Vaccine: Done    [] Breastfeeding:  [] Postpartum Birth control method:   [] GBS at 36 - 37 wks:  [x] 39 weeks discussion of IOL vs. Expectant management: Hoping for spontaneous labor  [x] Mode of delivery ( anticipated ): Vaginal              Other    Not immune to rubella    Overview     Offer MMR postpartum         Gastroesophageal reflux disease without esophagitis    Relevant Medications    omeprazole (PriLOSEC) 20 mg DR capsule    Family history of  congenital heart defect    Overview     FOB with history of heart defect, had surgery in childhood  Fetal echo normal              GCT today  tDap next visit  Start miralax for constipation  RTC in 4 weeks      Jhony Rodriguez MD

## 2025-01-10 LAB
ERYTHROCYTE [DISTWIDTH] IN BLOOD BY AUTOMATED COUNT: 14 % (ref 11.5–14.5)
GLUCOSE 1H P 50 G GLC PO SERPL-MCNC: 77 MG/DL
HCT VFR BLD AUTO: 35 % (ref 36–46)
HGB BLD-MCNC: 11.1 G/DL (ref 12–16)
MCH RBC QN AUTO: 32.4 PG (ref 26–34)
MCHC RBC AUTO-ENTMCNC: 31.7 G/DL (ref 32–36)
MCV RBC AUTO: 102 FL (ref 80–100)
NRBC BLD-RTO: 0 /100 WBCS (ref 0–0)
PLATELET # BLD AUTO: 228 X10*3/UL (ref 150–450)
RBC # BLD AUTO: 3.43 X10*6/UL (ref 4–5.2)
REFLEX ADDED, ANEMIA PANEL: NORMAL
TREPONEMA PALLIDUM IGG+IGM AB [PRESENCE] IN SERUM OR PLASMA BY IMMUNOASSAY: NONREACTIVE
WBC # BLD AUTO: 7.8 X10*3/UL (ref 4.4–11.3)

## 2025-01-31 ENCOUNTER — HOSPITAL ENCOUNTER (OUTPATIENT)
Dept: RADIOLOGY | Facility: CLINIC | Age: 40
Discharge: HOME | End: 2025-01-31
Payer: COMMERCIAL

## 2025-01-31 DIAGNOSIS — Z3A.01 7 WEEKS GESTATION OF PREGNANCY (HHS-HCC): ICD-10-CM

## 2025-01-31 DIAGNOSIS — Z12.4 CERVICAL CANCER SCREENING: ICD-10-CM

## 2025-01-31 DIAGNOSIS — Z34.81 PRENATAL CARE, SUBSEQUENT PREGNANCY IN FIRST TRIMESTER (HHS-HCC): ICD-10-CM

## 2025-01-31 PROCEDURE — 76816 OB US FOLLOW-UP PER FETUS: CPT

## 2025-01-31 PROCEDURE — 76819 FETAL BIOPHYS PROFIL W/O NST: CPT

## 2025-02-03 PROBLEM — Z34.90 ENCOUNTER FOR PREGNANCY RELATED EXAMINATION, ANTEPARTUM: Status: ACTIVE | Noted: 2024-08-29

## 2025-02-03 NOTE — PROGRESS NOTES
Ob Follow-up    SUBJECTIVE    HPI: Jennifer Soares is a 39 y.o.  at 30w2d here for RPNV.  She denies vaginal bleeding, leakage of fluid, decreased fetal movements, and contractions.    OBJECTIVE  Visit Vitals  /67   Wt 78 kg (172 lb)   LMP 2024   BMI 23.81 kg/m²   OB Status Pregnant   Smoking Status Former   BSA 1.98 m²      See OB flowsheet    ASSESSMENT & PLAN    Jennifer Soares is a 39 y.o.  at 30w2d here for the following concerns we addressed today:    Problem List Items Addressed This Visit          Ob-Gyn Problems    Single umbilical artery ([facility]) - Primary    Overview     US for growth:  Amenable to one US in 3rd trimester, but unsure about two  30w EFW 64%, normal interval growth         Encounter for pregnancy related examination, antepartum    Overview     [x] Blood Products: [x] Yes, accepts [] No, needs counseling  [x] Initial BMI: 21.67   [x] Prenatal Labs: wnl except for rubella non immune status  [x] Cervical Cancer Screening up to date: next due 2029  [x] Rh status: positive  [x] Genetic Screening:  rrcfDNA screen  [x] It's a surprise!  [x] NT US: (11-13 wks): 1.7mm  [x] Baby ASA:  Yes  [x] Pregnancy dated by: LMP = 9w3d scan    [x] Anatomy US: (19-20 wks) SUA otherwise normal  [x] 1hr GCT at 24-28wks: Normal  [] Fetal Surveillance (if indicated):  [x] Tdap: Done  [] RSV (32-36 wks) (Sept. to end ):   [x] Flu Vaccine: Done    [] Breastfeeding:  [] Postpartum Birth control method:   [] GBS at 36 - 37 wks:  [x] 39 weeks discussion of IOL vs. Expectant management: Hoping for spontaneous labor  [x] Mode of delivery ( anticipated ): Vaginal           Antepartum multigravida of advanced maternal age ([facility])    Overview     NT wnl              Other    Not immune to rubella    Overview     Offer MMR postpartum         Gastroesophageal reflux disease without esophagitis    Family history of congenital heart defect    Overview     FOB with history of heart defect, had surgery  in childhood  Fetal echo normal            tDap today  Constipation and GERD improved  RTC in 2 weeks    Jhony Rodriguez MD

## 2025-02-06 ENCOUNTER — APPOINTMENT (OUTPATIENT)
Dept: OBSTETRICS AND GYNECOLOGY | Facility: CLINIC | Age: 40
End: 2025-02-06
Payer: COMMERCIAL

## 2025-02-06 VITALS — WEIGHT: 172 LBS | DIASTOLIC BLOOD PRESSURE: 67 MMHG | SYSTOLIC BLOOD PRESSURE: 107 MMHG | BODY MASS INDEX: 23.81 KG/M2

## 2025-02-06 DIAGNOSIS — Z34.90 ENCOUNTER FOR PREGNANCY RELATED EXAMINATION, ANTEPARTUM: ICD-10-CM

## 2025-02-06 DIAGNOSIS — Z78.9 NOT IMMUNE TO RUBELLA: ICD-10-CM

## 2025-02-06 DIAGNOSIS — K21.9 GASTROESOPHAGEAL REFLUX DISEASE WITHOUT ESOPHAGITIS: ICD-10-CM

## 2025-02-06 DIAGNOSIS — O09.529 ANTEPARTUM MULTIGRAVIDA OF ADVANCED MATERNAL AGE (HHS-HCC): ICD-10-CM

## 2025-02-06 DIAGNOSIS — Q27.0 SINGLE UMBILICAL ARTERY (HHS-HCC): Primary | ICD-10-CM

## 2025-02-06 DIAGNOSIS — Z82.79 FAMILY HISTORY OF CONGENITAL HEART DEFECT: ICD-10-CM

## 2025-02-19 NOTE — PROGRESS NOTES
Ob Follow-up    SUBJECTIVE    HPI: Jennifer Soares is a 39 y.o.  at 32w2d here for RPNV.  She denies vaginal bleeding, leakage of fluid, decreased fetal movements, and contractions.    OBJECTIVE  Visit Vitals  /72   Wt 78.9 kg (174 lb)   LMP 2024   BMI 24.09 kg/m²   OB Status Pregnant   Smoking Status Former   BSA 1.99 m²      See OB flowsheet    ASSESSMENT & PLAN    Jennifer Soares is a 39 y.o.  at 32w2d here for the following concerns we addressed today:    Problem List Items Addressed This Visit          Ob-Gyn Problems    Single umbilical artery ([facility]) - Primary    Overview     US for growth:  Amenable to one US in 3rd trimester, but unsure about two  30w EFW 64%, normal interval growth         Encounter for pregnancy related examination, antepartum    Overview     [x] Blood Products: [x] Yes, accepts [] No, needs counseling  [x] Initial BMI: 21.67   [x] Prenatal Labs: wnl except for rubella non immune status  [x] Cervical Cancer Screening up to date: next due 2029  [x] Rh status: positive  [x] Genetic Screening:  rrcfDNA screen  [x] It's a surprise!  [x] NT US: (11-13 wks): 1.7mm  [x] Baby ASA:  Yes  [x] Pregnancy dated by: LMP = 9w3d scan    [x] Anatomy US: (19-20 wks) SUA otherwise normal  [x] 1hr GCT at 24-28wks: Normal  [x] Tdap: Done  [] RSV (32-36 wks) (Sept. to end ):   [x] Flu Vaccine: Done    [] Breastfeeding:  [] Postpartum Birth control method:   [] GBS at 36 - 37 wks:  [x] 39 weeks discussion of IOL vs. Expectant management: Hoping for spontaneous labor  [x] Mode of delivery ( anticipated ): Vaginal           Antepartum multigravida of advanced maternal age ([facility])    Overview     NT wnl              Other    Not immune to rubella    Overview     Offer MMR postpartum         Family history of congenital heart defect    Overview     FOB with history of heart defect, had surgery in childhood  Fetal echo normal          Had a fast labor with first baby  RTC in 2  clay Rodriguez MD

## 2025-02-20 ENCOUNTER — APPOINTMENT (OUTPATIENT)
Dept: OBSTETRICS AND GYNECOLOGY | Facility: CLINIC | Age: 40
End: 2025-02-20
Payer: COMMERCIAL

## 2025-02-20 VITALS — SYSTOLIC BLOOD PRESSURE: 114 MMHG | BODY MASS INDEX: 24.09 KG/M2 | WEIGHT: 174 LBS | DIASTOLIC BLOOD PRESSURE: 72 MMHG

## 2025-02-20 DIAGNOSIS — Z34.90 ENCOUNTER FOR PREGNANCY RELATED EXAMINATION, ANTEPARTUM: ICD-10-CM

## 2025-02-20 DIAGNOSIS — Q27.0 SINGLE UMBILICAL ARTERY (HHS-HCC): Primary | ICD-10-CM

## 2025-02-20 DIAGNOSIS — Z82.79 FAMILY HISTORY OF CONGENITAL HEART DEFECT: ICD-10-CM

## 2025-02-20 DIAGNOSIS — Z78.9 NOT IMMUNE TO RUBELLA: ICD-10-CM

## 2025-02-20 DIAGNOSIS — O09.529 ANTEPARTUM MULTIGRAVIDA OF ADVANCED MATERNAL AGE (HHS-HCC): ICD-10-CM

## 2025-02-20 PROCEDURE — 0501F PRENATAL FLOW SHEET: CPT | Performed by: OBSTETRICS & GYNECOLOGY

## 2025-03-03 NOTE — PROGRESS NOTES
Ob Follow-up    SUBJECTIVE    HPI: Jennifer Soares is a 39 y.o.  at 34w2d here for RPNV.  She denies vaginal bleeding, leakage of fluid, decreased fetal movements, and contractions.    OBJECTIVE  Visit Vitals  /78   Wt 79.4 kg (175 lb)   LMP 2024   BMI 24.23 kg/m²   OB Status Pregnant   Smoking Status Former   BSA 2 m²      See OB flowsheet    ASSESSMENT & PLAN    Jennifer Soares is a 39 y.o.  at 34w2d here for the following concerns we addressed today:    Problem List Items Addressed This Visit          Ob-Gyn Problems    Single umbilical artery ([facility]) - Primary    Overview     US for growth:  Amenable to one US in 3rd trimester, but unsure about two  30w EFW 64%, normal interval growth         Encounter for pregnancy related examination, antepartum    Overview     [x] Blood Products: [x] Yes, accepts [] No, needs counseling  [x] Initial BMI: 21.67   [x] Prenatal Labs: wnl except for rubella non immune status  [x] Cervical Cancer Screening up to date: next due 2029  [x] Rh status: positive  [x] Genetic Screening:  rrcfDNA screen  [x] It's a surprise!  [x] NT US: (11-13 wks): 1.7mm  [x] Baby ASA:  Yes  [x] Pregnancy dated by: LMP = 9w3d scan    [x] Anatomy US: (19-20 wks) SUA otherwise normal  [x] 1hr GCT at 24-28wks: Normal  [x] Tdap: Done  [] RSV (32-36 wks) (Sept. to end ):   [x] Flu Vaccine: Done    [] Breastfeeding:  [] Postpartum Birth control method:   [] GBS at 36 - 37 wks:  [x] 39 weeks discussion of IOL vs. Expectant management: Hoping for spontaneous labor  [x] Mode of delivery ( anticipated ): Vaginal           Antepartum multigravida of advanced maternal age ([facility])    Overview     NT wnl              Other    Not immune to rubella    Overview     Offer MMR postpartum         Family history of congenital heart defect    Overview     FOB with history of heart defect, had surgery in childhood  Fetal echo normal          Had a fast labor with first baby  GBS next visit  RTC  in 2 weeks for JARED/NST    Jhony Rodriguez MD

## 2025-03-06 ENCOUNTER — APPOINTMENT (OUTPATIENT)
Dept: OBSTETRICS AND GYNECOLOGY | Facility: CLINIC | Age: 40
End: 2025-03-06
Payer: COMMERCIAL

## 2025-03-06 VITALS — WEIGHT: 175 LBS | BODY MASS INDEX: 24.23 KG/M2 | SYSTOLIC BLOOD PRESSURE: 123 MMHG | DIASTOLIC BLOOD PRESSURE: 78 MMHG

## 2025-03-06 DIAGNOSIS — Z34.90 ENCOUNTER FOR PREGNANCY RELATED EXAMINATION, ANTEPARTUM: ICD-10-CM

## 2025-03-06 DIAGNOSIS — Z78.9 NOT IMMUNE TO RUBELLA: ICD-10-CM

## 2025-03-06 DIAGNOSIS — Z82.79 FAMILY HISTORY OF CONGENITAL HEART DEFECT: ICD-10-CM

## 2025-03-06 DIAGNOSIS — Q27.0 SINGLE UMBILICAL ARTERY (HHS-HCC): Primary | ICD-10-CM

## 2025-03-06 DIAGNOSIS — O09.529 ANTEPARTUM MULTIGRAVIDA OF ADVANCED MATERNAL AGE (HHS-HCC): ICD-10-CM

## 2025-03-06 PROCEDURE — 0501F PRENATAL FLOW SHEET: CPT | Performed by: OBSTETRICS & GYNECOLOGY

## 2025-03-18 NOTE — PROGRESS NOTES
Ob Follow-up    SUBJECTIVE    HPI: Jennifer Soares is a 39 y.o.  at 36w2d here for RPNV.  She denies vaginal bleeding, leakage of fluid, decreased fetal movements, and contractions.    OBJECTIVE  Visit Vitals  /70   Wt 81.6 kg (180 lb)   LMP 2024   BMI 24.92 kg/m²   OB Status Pregnant   Smoking Status Former   BSA 2.03 m²      See OB flowsheet    ASSESSMENT & PLAN    Jennifer Soares is a 39 y.o.  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  [x] NT US: (11-13 wks): 1.7mm  [x] Baby ASA:  Yes  [x] Pregnancy dated by: LMP = 9w3d scan    [x] Anatomy US: (19-20 wks) SUA otherwise normal  [x] 1hr GCT at 24-28wks: Normal  [x] Tdap: Done  [] RSV (32-36 wks) (Sept. to end ):   [x] Flu Vaccine: Done    [x] Breastfeeding: Yes  [] Postpartum Birth control method:   [] GBS at 36 - 37 wks:  [x] 39 weeks discussion of IOL vs. Expectant management: Hoping for spontaneous labor  [x] Mode of delivery ( anticipated ): Vaginal           Relevant Orders    QUEST CULTURE, GROUP B STREP WITH SUSCEPTIBILITY    Antepartum multigravida of advanced maternal age ([facility])    Overview     NT wnl              Other    Not immune to rubella    Overview     Offer MMR postpartum         Family history of congenital heart defect    Overview     FOB with history of heart defect, had surgery in  childhood  Fetal echo normal          Other Visit Diagnoses       Nausea and vomiting during pregnancy        Relevant Medications    ondansetron (Zofran) 8 mg tablet          Had a fast labor with first baby  GBS today(PCN allergy as a child, unknown) - Ancef in labor if positive  RTC in 1 week for JARED/NST    Jhony Rodriguez MD

## 2025-03-20 ENCOUNTER — APPOINTMENT (OUTPATIENT)
Dept: OBSTETRICS AND GYNECOLOGY | Facility: CLINIC | Age: 40
End: 2025-03-20
Payer: COMMERCIAL

## 2025-03-20 VITALS — DIASTOLIC BLOOD PRESSURE: 70 MMHG | WEIGHT: 180 LBS | BODY MASS INDEX: 24.92 KG/M2 | SYSTOLIC BLOOD PRESSURE: 116 MMHG

## 2025-03-20 DIAGNOSIS — Z82.79 FAMILY HISTORY OF CONGENITAL HEART DEFECT: ICD-10-CM

## 2025-03-20 DIAGNOSIS — Q27.0 SINGLE UMBILICAL ARTERY (HHS-HCC): Primary | ICD-10-CM

## 2025-03-20 DIAGNOSIS — Z78.9 NOT IMMUNE TO RUBELLA: ICD-10-CM

## 2025-03-20 DIAGNOSIS — O21.9 NAUSEA AND VOMITING DURING PREGNANCY: ICD-10-CM

## 2025-03-20 DIAGNOSIS — Z34.90 ENCOUNTER FOR PREGNANCY RELATED EXAMINATION, ANTEPARTUM: ICD-10-CM

## 2025-03-20 DIAGNOSIS — O09.529 ANTEPARTUM MULTIGRAVIDA OF ADVANCED MATERNAL AGE (HHS-HCC): ICD-10-CM

## 2025-03-20 PROCEDURE — 0501F PRENATAL FLOW SHEET: CPT | Performed by: OBSTETRICS & GYNECOLOGY

## 2025-03-20 PROCEDURE — 59025 FETAL NON-STRESS TEST: CPT | Performed by: OBSTETRICS & GYNECOLOGY

## 2025-03-20 RX ORDER — ONDANSETRON HYDROCHLORIDE 8 MG/1
8 TABLET, FILM COATED ORAL EVERY 8 HOURS PRN
Qty: 30 TABLET | Refills: 1 | Status: SHIPPED | OUTPATIENT
Start: 2025-03-20 | End: 2025-04-19

## 2025-03-20 NOTE — PROGRESS NOTES
Non-Stress Test   Baseline Fetal Heart Rate for Non-Stress Test: 130 BPM  Variability in Waveform for Non-Stress Test: Moderate  Accelerations in Non-Stress Test: Yes, greater than/equal to 15 bpm, lasting at least 15 seconds  Decelerations in Non-Stress Test: None  Contractions in Non-Stress Test: Not present  Interpretation of Non-Stress Test   Interpretation of Non-Stress Test: Reactive

## 2025-03-23 LAB — GP B STREP SPEC QL CULT: NORMAL

## 2025-03-25 ENCOUNTER — APPOINTMENT (OUTPATIENT)
Dept: OBSTETRICS AND GYNECOLOGY | Facility: CLINIC | Age: 40
End: 2025-03-25
Payer: COMMERCIAL

## 2025-03-25 VITALS — SYSTOLIC BLOOD PRESSURE: 103 MMHG | BODY MASS INDEX: 24.78 KG/M2 | WEIGHT: 179 LBS | DIASTOLIC BLOOD PRESSURE: 69 MMHG

## 2025-03-25 DIAGNOSIS — Z78.9 NOT IMMUNE TO RUBELLA: ICD-10-CM

## 2025-03-25 DIAGNOSIS — O09.529 ANTEPARTUM MULTIGRAVIDA OF ADVANCED MATERNAL AGE (HHS-HCC): ICD-10-CM

## 2025-03-25 DIAGNOSIS — Z34.90 ENCOUNTER FOR PREGNANCY RELATED EXAMINATION, ANTEPARTUM: ICD-10-CM

## 2025-03-25 DIAGNOSIS — Z82.79 FAMILY HISTORY OF CONGENITAL HEART DEFECT: Primary | ICD-10-CM

## 2025-03-25 DIAGNOSIS — Q27.0 SINGLE UMBILICAL ARTERY (HHS-HCC): ICD-10-CM

## 2025-03-25 NOTE — PROGRESS NOTES
Assessment/Plan   Problem List Items Addressed This Visit       Family history of congenital heart defect - Primary    Overview     FOB with history of heart defect, had surgery in childhood  Fetal echo normal         Encounter for pregnancy related examination, antepartum    Overview     [x] Blood Products: [x] Yes, accepts [] No, needs counseling  [x] Initial BMI: 21.67   [x] Prenatal Labs: wnl except for rubella non immune status  [x] Cervical Cancer Screening up to date: next due 2029  [x] Rh status: positive  [x] Genetic Screening:  rrcfDNA screen  [x] It's a surprise!  [x] NT US: (11-13 wks): 1.7mm  [x] Baby ASA:  Yes  [x] Pregnancy dated by: LMP = 9w3d scan    [x] Anatomy US: (19-20 wks) SUA otherwise normal  [x] 1hr GCT at 24-28wks: Normal  [x] Tdap: Done  [] RSV (32-36 wks) (Sept. to end of ):   [x] Flu Vaccine: Done    [x] Breastfeeding: Yes  [] Postpartum Birth control method:   [] GBS at 36 - 37 wks:  [x] 39 weeks discussion of IOL vs. Expectant management: Hoping for spontaneous labor  [x] Mode of delivery ( anticipated ): Vaginal           Antepartum multigravida of advanced maternal age ([facility])    Overview     NT wnl           Not immune to rubella    Overview     Offer MMR postpartum         Single umbilical artery ([facility])    Overview     US for growth:  Amenable to one US in 3rd trimester, but unsure about two  30w EFW 64%, normal interval growth               Reviewed s/sx of labor, warning signs, fetal movement counts, and when to call provider  Follow up in 1 week for a routine prenatal visit.    Savannah Harvey, TWAN-SELMA    Subjective     Jennifer Soares is a 40 y.o.  at 37w0d with a working estimated date of delivery of 4/15/2025, by Last Menstrual Period who presents for a routine prenatal visit.   NST completed today.     Vaginal bleeding no  Leakage of fluid no  Decreased fetal movements no  Contractions no    Postpartum Depression: Low Risk  (2025)    [place]   "Depression Scale     Last EPDS Total Score: 0     Last EPDS Self Harm Result: Never        Prenatal Labs  Lab Results   Component Value Date    HGB 11.1 (L) 01/09/2025    HCT 35.0 (L) 01/09/2025     01/09/2025    ABO O 08/29/2024    LABRH POS 08/29/2024    NEISSGONOAMP Negative 08/29/2024    CHLAMTRACAMP Negative 08/29/2024    SYPHT Nonreactive 01/09/2025    HEPBSAG Nonreactive 08/29/2024    HIV1X2 Nonreactive 08/29/2024    URINECULTURE No significant growth 08/29/2024     Lab Results   Component Value Date    GLUC1P 77 01/09/2025     No results found for: \"GRPBSTREP\"     Education provided  "

## 2025-04-01 NOTE — PROGRESS NOTES
Ob Follow-up    SUBJECTIVE    HPI: Jennifer Soares is a 40 y.o.  at 38w2d here for RPNV.  She denies vaginal bleeding, leakage of fluid, decreased fetal movements, and contractions.    OBJECTIVE  Visit Vitals  /64   Wt 82.1 kg (181 lb)   LMP 2024   BMI 25.06 kg/m²   OB Status Pregnant   Smoking Status Former   BSA 2.03 m²      See OB flowsheet    ASSESSMENT & PLAN    Jennifer Soares is a 40 y.o.  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  [x] NT US: (11-13 wks): 1.7mm  [x] Baby ASA:  Yes  [x] Pregnancy dated by: LMP = 9w3d scan    [x] Anatomy US: (19-20 wks) SUA otherwise normal  [x] 1hr GCT at 24-28wks: Normal  [x] Tdap: Done  [] RSV (32-36 wks) (Sept. to end ):   [x] Flu Vaccine: Done    [x] Breastfeeding: Yes  [] Postpartum Birth control method:   [x] GBS at 36 - 37 wks: Negative  [x] 39 weeks discussion of IOL vs. Expectant management: Hoping for spontaneous labor  [x] Mode of delivery ( anticipated ): Vaginal           Antepartum multigravida of advanced maternal age ([facility])    Overview     NT wnl              Other    Not immune to rubella    Overview     Offer MMR postpartum         Family history of congenital heart defect    Overview     FOB with history of heart defect, had surgery in childhood  Fetal echo normal          Had a fast labor with first  baby  Hoping for spontaneous labor, declines IOL at this time.    Considering membrane stripping at next visit.  RTC in 1 week for JARED/GILBERT Rodriguez MD

## 2025-04-03 ENCOUNTER — APPOINTMENT (OUTPATIENT)
Dept: OBSTETRICS AND GYNECOLOGY | Facility: CLINIC | Age: 40
End: 2025-04-03
Payer: COMMERCIAL

## 2025-04-03 VITALS — SYSTOLIC BLOOD PRESSURE: 100 MMHG | WEIGHT: 181 LBS | DIASTOLIC BLOOD PRESSURE: 64 MMHG | BODY MASS INDEX: 25.06 KG/M2

## 2025-04-03 DIAGNOSIS — Z34.90 ENCOUNTER FOR PREGNANCY RELATED EXAMINATION, ANTEPARTUM: ICD-10-CM

## 2025-04-03 DIAGNOSIS — Z78.9 NOT IMMUNE TO RUBELLA: ICD-10-CM

## 2025-04-03 DIAGNOSIS — O09.529 ANTEPARTUM MULTIGRAVIDA OF ADVANCED MATERNAL AGE (HHS-HCC): ICD-10-CM

## 2025-04-03 DIAGNOSIS — Q27.0 SINGLE UMBILICAL ARTERY (HHS-HCC): Primary | ICD-10-CM

## 2025-04-03 DIAGNOSIS — Z82.79 FAMILY HISTORY OF CONGENITAL HEART DEFECT: ICD-10-CM

## 2025-04-03 PROCEDURE — 59025 FETAL NON-STRESS TEST: CPT | Performed by: OBSTETRICS & GYNECOLOGY

## 2025-04-03 PROCEDURE — 0501F PRENATAL FLOW SHEET: CPT | Performed by: OBSTETRICS & GYNECOLOGY

## 2025-04-03 NOTE — PROGRESS NOTES
Non-Stress Test   Baseline Fetal Heart Rate for Non-Stress Test: 150 BPM  Variability in Waveform for Non-Stress Test: Moderate  Accelerations in Non-Stress Test: Yes  Decelerations in Non-Stress Test: None  Contractions in Non-Stress Test: Irregular  Interpretation of Non-Stress Test   Interpretation of Non-Stress Test: Reactive

## 2025-04-07 NOTE — PROGRESS NOTES
Ob Follow-up    SUBJECTIVE    HPI: Jennifer Soares is a 40 y.o.  at 39w2d here for RPNV.  She denies vaginal bleeding, leakage of fluid, decreased fetal movements, and contractions.  Requesting membrane stripping today.   Feeling more pressure.      OBJECTIVE  Visit Vitals  /71   Wt 81.6 kg (180 lb)   LMP 2024   BMI 24.92 kg/m²   OB Status Pregnant   Smoking Status Former   BSA 2.03 m²      See OB flowsheet    ASSESSMENT & PLAN    Jennifer Soares is a 40 y.o.  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  [x] NT US: (11-13 wks): 1.7mm  [x] Baby ASA:  Yes  [x] Pregnancy dated by: LMP = 9w3d scan    [x] Anatomy US: (19-20 wks) SUA otherwise normal  [x] 1hr GCT at 24-28wks: Normal  [x] Tdap: Done  [] RSV (32-36 wks) (Sept. to end of ):   [x] Flu Vaccine: Done    [x] Breastfeeding: Yes  [] Postpartum Birth control method:   [x] GBS at 36 - 37 wks: Negative  [x] 39 weeks discussion of IOL vs. Expectant management: Hoping for spontaneous labor  [x] Mode of delivery ( anticipated ): Vaginal           Antepartum multigravida of advanced maternal age ([facility])    Overview     NT wnl              Other    Not immune to rubella    Overview     Offer MMR postpartum         Family history of congenital heart defect    Overview     FOB with history of heart defect, had surgery in  childhood  Fetal echo normal          Had a fast labor with first baby  Hoping for spontaneous labor, declines IOL at this time.    Considering membrane stripping at next visit.  RTC in 1 week for JARED/NST    Jhony Rodriguez MD

## 2025-04-10 ENCOUNTER — APPOINTMENT (OUTPATIENT)
Dept: OBSTETRICS AND GYNECOLOGY | Facility: CLINIC | Age: 40
End: 2025-04-10
Payer: COMMERCIAL

## 2025-04-10 ENCOUNTER — TELEPHONE (OUTPATIENT)
Dept: OBSTETRICS AND GYNECOLOGY | Facility: CLINIC | Age: 40
End: 2025-04-10

## 2025-04-10 VITALS — WEIGHT: 180 LBS | SYSTOLIC BLOOD PRESSURE: 111 MMHG | DIASTOLIC BLOOD PRESSURE: 71 MMHG | BODY MASS INDEX: 24.92 KG/M2

## 2025-04-10 DIAGNOSIS — Z34.90 ENCOUNTER FOR INDUCTION OF LABOR: ICD-10-CM

## 2025-04-10 DIAGNOSIS — Z34.90 ENCOUNTER FOR PREGNANCY RELATED EXAMINATION, ANTEPARTUM: ICD-10-CM

## 2025-04-10 DIAGNOSIS — Z82.79 FAMILY HISTORY OF CONGENITAL HEART DEFECT: ICD-10-CM

## 2025-04-10 DIAGNOSIS — O09.529 ANTEPARTUM MULTIGRAVIDA OF ADVANCED MATERNAL AGE (HHS-HCC): ICD-10-CM

## 2025-04-10 DIAGNOSIS — Z78.9 NOT IMMUNE TO RUBELLA: ICD-10-CM

## 2025-04-10 DIAGNOSIS — Q27.0 SINGLE UMBILICAL ARTERY (HHS-HCC): Primary | ICD-10-CM

## 2025-04-10 PROCEDURE — 0501F PRENATAL FLOW SHEET: CPT | Performed by: OBSTETRICS & GYNECOLOGY

## 2025-04-10 NOTE — TELEPHONE ENCOUNTER
Called induction scheduling to get patient scheduled for IOL  Identified by name and   Patient scheduled for date and time requested by provider    Called patient to discuss IOL  Identified by name and   Informed patient of IOL date and time  Patient verbalized understanding, all questions/concerns addressed at this time.    SUE HugoN RN    ----- Message from Jhony Rodriguez sent at 4/10/2025 12:34 PM EDT -----  Please schedule induction of labor on 4/15 at 8am at [facility].  The patient in that slot has delivered, so it is open.  Thanks.

## 2025-04-15 ENCOUNTER — APPOINTMENT (OUTPATIENT)
Dept: OBSTETRICS AND GYNECOLOGY | Facility: HOSPITAL | Age: 40
End: 2025-04-15
Payer: COMMERCIAL

## 2025-04-15 ENCOUNTER — ANESTHESIA (OUTPATIENT)
Dept: OBSTETRICS AND GYNECOLOGY | Facility: HOSPITAL | Age: 40
End: 2025-04-15
Payer: COMMERCIAL

## 2025-04-15 ENCOUNTER — ANESTHESIA EVENT (OUTPATIENT)
Dept: OBSTETRICS AND GYNECOLOGY | Facility: HOSPITAL | Age: 40
End: 2025-04-15
Payer: COMMERCIAL

## 2025-04-15 ENCOUNTER — HOSPITAL ENCOUNTER (INPATIENT)
Facility: HOSPITAL | Age: 40
LOS: 2 days | Discharge: HOME | End: 2025-04-17
Attending: OBSTETRICS & GYNECOLOGY | Admitting: OBSTETRICS & GYNECOLOGY
Payer: COMMERCIAL

## 2025-04-15 DIAGNOSIS — Z34.90 ENCOUNTER FOR INDUCTION OF LABOR: ICD-10-CM

## 2025-04-15 PROBLEM — Z3A.39 39 WEEKS GESTATION OF PREGNANCY (HHS-HCC): Status: ACTIVE | Noted: 2025-04-15

## 2025-04-15 LAB
ABO GROUP (TYPE) IN BLOOD: NORMAL
ANTIBODY SCREEN: NORMAL
ERYTHROCYTE [DISTWIDTH] IN BLOOD BY AUTOMATED COUNT: 13.5 % (ref 11.5–14.5)
HCT VFR BLD AUTO: 34.2 % (ref 36–46)
HGB BLD-MCNC: 12.2 G/DL (ref 12–16)
MCH RBC QN AUTO: 33.7 PG (ref 26–34)
MCHC RBC AUTO-ENTMCNC: 35.7 G/DL (ref 32–36)
MCV RBC AUTO: 95 FL (ref 80–100)
NRBC BLD-RTO: 0 /100 WBCS (ref 0–0)
PLATELET # BLD AUTO: 202 X10*3/UL (ref 150–450)
RBC # BLD AUTO: 3.62 X10*6/UL (ref 4–5.2)
RH FACTOR (ANTIGEN D): NORMAL
TREPONEMA PALLIDUM IGG+IGM AB [PRESENCE] IN SERUM OR PLASMA BY IMMUNOASSAY: NONREACTIVE
WBC # BLD AUTO: 7.5 X10*3/UL (ref 4.4–11.3)

## 2025-04-15 PROCEDURE — 2500000004 HC RX 250 GENERAL PHARMACY W/ HCPCS (ALT 636 FOR OP/ED): Performed by: NURSE ANESTHETIST, CERTIFIED REGISTERED

## 2025-04-15 PROCEDURE — 10907ZC DRAINAGE OF AMNIOTIC FLUID, THERAPEUTIC FROM PRODUCTS OF CONCEPTION, VIA NATURAL OR ARTIFICIAL OPENING: ICD-10-PCS | Performed by: OBSTETRICS & GYNECOLOGY

## 2025-04-15 PROCEDURE — 59400 OBSTETRICAL CARE: CPT | Performed by: OBSTETRICS & GYNECOLOGY

## 2025-04-15 PROCEDURE — 36415 COLL VENOUS BLD VENIPUNCTURE: CPT | Performed by: OBSTETRICS & GYNECOLOGY

## 2025-04-15 PROCEDURE — 59409 OBSTETRICAL CARE: CPT | Performed by: OBSTETRICS & GYNECOLOGY

## 2025-04-15 PROCEDURE — 7100000016 HC LABOR RECOVERY PER HOUR

## 2025-04-15 PROCEDURE — 59050 FETAL MONITOR W/REPORT: CPT

## 2025-04-15 PROCEDURE — 7210000002 HC LABOR PER HOUR

## 2025-04-15 PROCEDURE — 85027 COMPLETE CBC AUTOMATED: CPT | Performed by: OBSTETRICS & GYNECOLOGY

## 2025-04-15 PROCEDURE — 2500000001 HC RX 250 WO HCPCS SELF ADMINISTERED DRUGS (ALT 637 FOR MEDICARE OP): Performed by: OBSTETRICS & GYNECOLOGY

## 2025-04-15 PROCEDURE — 2500000004 HC RX 250 GENERAL PHARMACY W/ HCPCS (ALT 636 FOR OP/ED): Performed by: OBSTETRICS & GYNECOLOGY

## 2025-04-15 PROCEDURE — 1100000001 HC PRIVATE ROOM DAILY

## 2025-04-15 PROCEDURE — 3700000014 EPIDURAL BLOCK: Performed by: NURSE ANESTHETIST, CERTIFIED REGISTERED

## 2025-04-15 PROCEDURE — 01967 NEURAXL LBR ANES VAG DLVR: CPT | Performed by: NURSE ANESTHETIST, CERTIFIED REGISTERED

## 2025-04-15 PROCEDURE — 0HQ9XZZ REPAIR PERINEUM SKIN, EXTERNAL APPROACH: ICD-10-PCS | Performed by: OBSTETRICS & GYNECOLOGY

## 2025-04-15 PROCEDURE — 86780 TREPONEMA PALLIDUM: CPT | Mod: AHULAB | Performed by: OBSTETRICS & GYNECOLOGY

## 2025-04-15 PROCEDURE — 2500000005 HC RX 250 GENERAL PHARMACY W/O HCPCS: Performed by: OBSTETRICS & GYNECOLOGY

## 2025-04-15 PROCEDURE — 86901 BLOOD TYPING SEROLOGIC RH(D): CPT | Performed by: OBSTETRICS & GYNECOLOGY

## 2025-04-15 PROCEDURE — 3E033VJ INTRODUCTION OF OTHER HORMONE INTO PERIPHERAL VEIN, PERCUTANEOUS APPROACH: ICD-10-PCS | Performed by: OBSTETRICS & GYNECOLOGY

## 2025-04-15 RX ORDER — BUPIVACAINE HYDROCHLORIDE 2.5 MG/ML
INJECTION, SOLUTION EPIDURAL; INFILTRATION; INTRACAUDAL; PERINEURAL AS NEEDED
Status: DISCONTINUED | OUTPATIENT
Start: 2025-04-15 | End: 2025-04-15

## 2025-04-15 RX ORDER — OXYTOCIN/0.9 % SODIUM CHLORIDE 30/500 ML
60 PLASTIC BAG, INJECTION (ML) INTRAVENOUS ONCE AS NEEDED
Status: DISCONTINUED | OUTPATIENT
Start: 2025-04-15 | End: 2025-04-15

## 2025-04-15 RX ORDER — OXYTOCIN/0.9 % SODIUM CHLORIDE 30/500 ML
60 PLASTIC BAG, INJECTION (ML) INTRAVENOUS ONCE AS NEEDED
Status: COMPLETED | OUTPATIENT
Start: 2025-04-15 | End: 2025-04-15

## 2025-04-15 RX ORDER — LIDOCAINE HCL/EPINEPHRINE/PF 2%-1:200K
VIAL (ML) INJECTION AS NEEDED
Status: DISCONTINUED | OUTPATIENT
Start: 2025-04-15 | End: 2025-04-15

## 2025-04-15 RX ORDER — METHYLERGONOVINE MALEATE 0.2 MG/ML
0.2 INJECTION INTRAVENOUS ONCE AS NEEDED
Status: DISCONTINUED | OUTPATIENT
Start: 2025-04-15 | End: 2025-04-15

## 2025-04-15 RX ORDER — CARBOPROST TROMETHAMINE 250 UG/ML
250 INJECTION, SOLUTION INTRAMUSCULAR ONCE AS NEEDED
Status: DISCONTINUED | OUTPATIENT
Start: 2025-04-15 | End: 2025-04-15

## 2025-04-15 RX ORDER — FENTANYL/ROPIVACAINE/NS/PF 2MCG/ML-.2
0-25 PLASTIC BAG, INJECTION (ML) INJECTION CONTINUOUS
Status: DISCONTINUED | OUTPATIENT
Start: 2025-04-15 | End: 2025-04-15

## 2025-04-15 RX ORDER — TERBUTALINE SULFATE 1 MG/ML
0.25 INJECTION SUBCUTANEOUS ONCE AS NEEDED
Status: DISCONTINUED | OUTPATIENT
Start: 2025-04-15 | End: 2025-04-15

## 2025-04-15 RX ORDER — LABETALOL HYDROCHLORIDE 5 MG/ML
20 INJECTION, SOLUTION INTRAVENOUS ONCE AS NEEDED
Status: DISCONTINUED | OUTPATIENT
Start: 2025-04-15 | End: 2025-04-15

## 2025-04-15 RX ORDER — TRANEXAMIC ACID 100 MG/ML
1000 INJECTION, SOLUTION INTRAVENOUS ONCE AS NEEDED
Status: DISCONTINUED | OUTPATIENT
Start: 2025-04-15 | End: 2025-04-17 | Stop reason: HOSPADM

## 2025-04-15 RX ORDER — OXYTOCIN/0.9 % SODIUM CHLORIDE 30/500 ML
2-30 PLASTIC BAG, INJECTION (ML) INTRAVENOUS CONTINUOUS
Status: DISCONTINUED | OUTPATIENT
Start: 2025-04-15 | End: 2025-04-15

## 2025-04-15 RX ORDER — LABETALOL HYDROCHLORIDE 5 MG/ML
20 INJECTION, SOLUTION INTRAVENOUS ONCE AS NEEDED
Status: DISCONTINUED | OUTPATIENT
Start: 2025-04-15 | End: 2025-04-17 | Stop reason: HOSPADM

## 2025-04-15 RX ORDER — POLYETHYLENE GLYCOL 3350 17 G/17G
17 POWDER, FOR SOLUTION ORAL 2 TIMES DAILY PRN
Status: DISCONTINUED | OUTPATIENT
Start: 2025-04-15 | End: 2025-04-17 | Stop reason: HOSPADM

## 2025-04-15 RX ORDER — IBUPROFEN 600 MG/1
600 TABLET ORAL EVERY 6 HOURS
Status: DISCONTINUED | OUTPATIENT
Start: 2025-04-15 | End: 2025-04-17 | Stop reason: HOSPADM

## 2025-04-15 RX ORDER — DIPHENHYDRAMINE HYDROCHLORIDE 50 MG/ML
25 INJECTION, SOLUTION INTRAMUSCULAR; INTRAVENOUS EVERY 6 HOURS PRN
Status: DISCONTINUED | OUTPATIENT
Start: 2025-04-15 | End: 2025-04-17 | Stop reason: HOSPADM

## 2025-04-15 RX ORDER — OXYTOCIN 10 [USP'U]/ML
10 INJECTION, SOLUTION INTRAMUSCULAR; INTRAVENOUS ONCE AS NEEDED
Status: DISCONTINUED | OUTPATIENT
Start: 2025-04-15 | End: 2025-04-15

## 2025-04-15 RX ORDER — HYDRALAZINE HYDROCHLORIDE 20 MG/ML
5 INJECTION INTRAMUSCULAR; INTRAVENOUS ONCE AS NEEDED
Status: DISCONTINUED | OUTPATIENT
Start: 2025-04-15 | End: 2025-04-17 | Stop reason: HOSPADM

## 2025-04-15 RX ORDER — ACETAMINOPHEN 325 MG/1
975 TABLET ORAL EVERY 6 HOURS
Status: DISCONTINUED | OUTPATIENT
Start: 2025-04-15 | End: 2025-04-17 | Stop reason: HOSPADM

## 2025-04-15 RX ORDER — LIDOCAINE HYDROCHLORIDE 10 MG/ML
30 INJECTION, SOLUTION INFILTRATION; PERINEURAL ONCE AS NEEDED
Status: DISCONTINUED | OUTPATIENT
Start: 2025-04-15 | End: 2025-04-15

## 2025-04-15 RX ORDER — METHYLERGONOVINE MALEATE 0.2 MG/ML
0.2 INJECTION INTRAVENOUS ONCE AS NEEDED
Status: DISCONTINUED | OUTPATIENT
Start: 2025-04-15 | End: 2025-04-17 | Stop reason: HOSPADM

## 2025-04-15 RX ORDER — CARBOPROST TROMETHAMINE 250 UG/ML
250 INJECTION, SOLUTION INTRAMUSCULAR ONCE AS NEEDED
Status: DISCONTINUED | OUTPATIENT
Start: 2025-04-15 | End: 2025-04-17 | Stop reason: HOSPADM

## 2025-04-15 RX ORDER — ONDANSETRON HYDROCHLORIDE 2 MG/ML
4 INJECTION, SOLUTION INTRAVENOUS EVERY 6 HOURS PRN
Status: DISCONTINUED | OUTPATIENT
Start: 2025-04-15 | End: 2025-04-15

## 2025-04-15 RX ORDER — LOPERAMIDE HYDROCHLORIDE 2 MG/1
4 CAPSULE ORAL EVERY 2 HOUR PRN
Status: DISCONTINUED | OUTPATIENT
Start: 2025-04-15 | End: 2025-04-15

## 2025-04-15 RX ORDER — HYDRALAZINE HYDROCHLORIDE 20 MG/ML
5 INJECTION INTRAMUSCULAR; INTRAVENOUS ONCE AS NEEDED
Status: DISCONTINUED | OUTPATIENT
Start: 2025-04-15 | End: 2025-04-15

## 2025-04-15 RX ORDER — ONDANSETRON 4 MG/1
4 TABLET, FILM COATED ORAL EVERY 6 HOURS PRN
Status: DISCONTINUED | OUTPATIENT
Start: 2025-04-15 | End: 2025-04-15

## 2025-04-15 RX ORDER — ONDANSETRON 4 MG/1
4 TABLET, FILM COATED ORAL EVERY 6 HOURS PRN
Status: DISCONTINUED | OUTPATIENT
Start: 2025-04-15 | End: 2025-04-17 | Stop reason: HOSPADM

## 2025-04-15 RX ORDER — LOPERAMIDE HYDROCHLORIDE 2 MG/1
4 CAPSULE ORAL EVERY 2 HOUR PRN
Status: DISCONTINUED | OUTPATIENT
Start: 2025-04-15 | End: 2025-04-17 | Stop reason: HOSPADM

## 2025-04-15 RX ORDER — ADHESIVE BANDAGE
10 BANDAGE TOPICAL
Status: DISCONTINUED | OUTPATIENT
Start: 2025-04-15 | End: 2025-04-17 | Stop reason: HOSPADM

## 2025-04-15 RX ORDER — OXYTOCIN 10 [USP'U]/ML
10 INJECTION, SOLUTION INTRAMUSCULAR; INTRAVENOUS ONCE AS NEEDED
Status: DISCONTINUED | OUTPATIENT
Start: 2025-04-15 | End: 2025-04-17 | Stop reason: HOSPADM

## 2025-04-15 RX ORDER — MISOPROSTOL 200 UG/1
800 TABLET ORAL ONCE AS NEEDED
Status: DISCONTINUED | OUTPATIENT
Start: 2025-04-15 | End: 2025-04-15

## 2025-04-15 RX ORDER — DIPHENHYDRAMINE HCL 25 MG
25 CAPSULE ORAL EVERY 6 HOURS PRN
Status: DISCONTINUED | OUTPATIENT
Start: 2025-04-15 | End: 2025-04-17 | Stop reason: HOSPADM

## 2025-04-15 RX ORDER — SODIUM CHLORIDE, SODIUM LACTATE, POTASSIUM CHLORIDE, CALCIUM CHLORIDE 600; 310; 30; 20 MG/100ML; MG/100ML; MG/100ML; MG/100ML
75 INJECTION, SOLUTION INTRAVENOUS CONTINUOUS
Status: DISCONTINUED | OUTPATIENT
Start: 2025-04-15 | End: 2025-04-15

## 2025-04-15 RX ORDER — TRANEXAMIC ACID 100 MG/ML
1000 INJECTION, SOLUTION INTRAVENOUS ONCE AS NEEDED
Status: DISCONTINUED | OUTPATIENT
Start: 2025-04-15 | End: 2025-04-15

## 2025-04-15 RX ORDER — ONDANSETRON HYDROCHLORIDE 2 MG/ML
4 INJECTION, SOLUTION INTRAVENOUS EVERY 6 HOURS PRN
Status: DISCONTINUED | OUTPATIENT
Start: 2025-04-15 | End: 2025-04-17 | Stop reason: HOSPADM

## 2025-04-15 RX ORDER — MISOPROSTOL 200 UG/1
800 TABLET ORAL ONCE AS NEEDED
Status: DISCONTINUED | OUTPATIENT
Start: 2025-04-15 | End: 2025-04-17 | Stop reason: HOSPADM

## 2025-04-15 RX ORDER — SIMETHICONE 80 MG
80 TABLET,CHEWABLE ORAL 4 TIMES DAILY PRN
Status: DISCONTINUED | OUTPATIENT
Start: 2025-04-15 | End: 2025-04-17 | Stop reason: HOSPADM

## 2025-04-15 RX ADMIN — IBUPROFEN 600 MG: 600 TABLET ORAL at 16:11

## 2025-04-15 RX ADMIN — LIDOCAINE HYDROCHLORIDE,EPINEPHRINE BITARTRATE 2 ML: 20; .005 INJECTION, SOLUTION EPIDURAL; INFILTRATION; INTRACAUDAL; PERINEURAL at 13:18

## 2025-04-15 RX ADMIN — ACETAMINOPHEN 975 MG: 325 TABLET, FILM COATED ORAL at 16:11

## 2025-04-15 RX ADMIN — ACETAMINOPHEN 975 MG: 325 TABLET, FILM COATED ORAL at 22:24

## 2025-04-15 RX ADMIN — IBUPROFEN 600 MG: 600 TABLET ORAL at 22:24

## 2025-04-15 RX ADMIN — Medication 1 EACH: at 16:11

## 2025-04-15 RX ADMIN — Medication 10 ML/HR: at 13:12

## 2025-04-15 RX ADMIN — BUPIVACAINE HYDROCHLORIDE 5 ML: 2.5 INJECTION, SOLUTION EPIDURAL; INFILTRATION; INTRACAUDAL; PERINEURAL at 13:08

## 2025-04-15 RX ADMIN — SODIUM CHLORIDE, SODIUM LACTATE, POTASSIUM CHLORIDE, AND CALCIUM CHLORIDE 75 ML/HR: .6; .31; .03; .02 INJECTION, SOLUTION INTRAVENOUS at 09:05

## 2025-04-15 RX ADMIN — POLYETHYLENE GLYCOL 3350 17 G: 17 POWDER, FOR SOLUTION ORAL at 22:31

## 2025-04-15 RX ADMIN — BENZOCAINE AND LEVOMENTHOL 1 APPLICATION: 200; 5 SPRAY TOPICAL at 16:10

## 2025-04-15 RX ADMIN — Medication 2 MILLI-UNITS/MIN: at 09:06

## 2025-04-15 RX ADMIN — SODIUM CHLORIDE, SODIUM LACTATE, POTASSIUM CHLORIDE, AND CALCIUM CHLORIDE 500 ML: .6; .31; .03; .02 INJECTION, SOLUTION INTRAVENOUS at 12:44

## 2025-04-15 RX ADMIN — Medication 60 MILLI-UNITS/MIN: at 14:24

## 2025-04-15 RX ADMIN — LIDOCAINE HYDROCHLORIDE,EPINEPHRINE BITARTRATE 3 ML: 20; .005 INJECTION, SOLUTION EPIDURAL; INFILTRATION; INTRACAUDAL; PERINEURAL at 13:14

## 2025-04-15 RX ADMIN — LIDOCAINE HYDROCHLORIDE,EPINEPHRINE BITARTRATE 5 ML: 20; .005 INJECTION, SOLUTION EPIDURAL; INFILTRATION; INTRACAUDAL; PERINEURAL at 13:03

## 2025-04-15 SDOH — HEALTH STABILITY: MENTAL HEALTH: NON-SPECIFIC ACTIVE SUICIDAL THOUGHTS (PAST 1 MONTH): NO

## 2025-04-15 SDOH — SOCIAL STABILITY: SOCIAL INSECURITY
WITHIN THE LAST YEAR, HAVE YOU BEEN RAPED OR FORCED TO HAVE ANY KIND OF SEXUAL ACTIVITY BY YOUR PARTNER OR EX-PARTNER?: NO

## 2025-04-15 SDOH — HEALTH STABILITY: MENTAL HEALTH: SUICIDAL BEHAVIOR (LIFETIME): NO

## 2025-04-15 SDOH — SOCIAL STABILITY: SOCIAL INSECURITY: DOES ANYONE TRY TO KEEP YOU FROM HAVING/CONTACTING OTHER FRIENDS OR DOING THINGS OUTSIDE YOUR HOME?: NO

## 2025-04-15 SDOH — HEALTH STABILITY: MENTAL HEALTH: CURRENT SMOKER: 0

## 2025-04-15 SDOH — SOCIAL STABILITY: SOCIAL INSECURITY: WITHIN THE LAST YEAR, HAVE YOU BEEN AFRAID OF YOUR PARTNER OR EX-PARTNER?: NO

## 2025-04-15 SDOH — ECONOMIC STABILITY: FOOD INSECURITY: WITHIN THE PAST 12 MONTHS, YOU WORRIED THAT YOUR FOOD WOULD RUN OUT BEFORE YOU GOT THE MONEY TO BUY MORE.: NEVER TRUE

## 2025-04-15 SDOH — SOCIAL STABILITY: SOCIAL INSECURITY: WITHIN THE LAST YEAR, HAVE YOU BEEN HUMILIATED OR EMOTIONALLY ABUSED IN OTHER WAYS BY YOUR PARTNER OR EX-PARTNER?: NO

## 2025-04-15 SDOH — SOCIAL STABILITY: SOCIAL INSECURITY: HAS ANYONE EVER THREATENED TO HURT YOUR FAMILY OR YOUR PETS?: NO

## 2025-04-15 SDOH — HEALTH STABILITY: MENTAL HEALTH: HAVE YOU USED ANY PRESCRIPTION DRUGS OTHER THAN PRESCRIBED IN THE PAST 12 MONTHS?: NO

## 2025-04-15 SDOH — HEALTH STABILITY: MENTAL HEALTH: WISH TO BE DEAD (PAST 1 MONTH): NO

## 2025-04-15 SDOH — ECONOMIC STABILITY: FOOD INSECURITY: WITHIN THE PAST 12 MONTHS, THE FOOD YOU BOUGHT JUST DIDN'T LAST AND YOU DIDN'T HAVE MONEY TO GET MORE.: NEVER TRUE

## 2025-04-15 SDOH — SOCIAL STABILITY: SOCIAL INSECURITY: PHYSICAL ABUSE: DENIES

## 2025-04-15 SDOH — SOCIAL STABILITY: SOCIAL INSECURITY
WITHIN THE LAST YEAR, HAVE YOU BEEN KICKED, HIT, SLAPPED, OR OTHERWISE PHYSICALLY HURT BY YOUR PARTNER OR EX-PARTNER?: NO

## 2025-04-15 SDOH — HEALTH STABILITY: MENTAL HEALTH: REASON FOR INCOMPLETE PSYCHIATRIC SCREENING: UNABLE TO ASSESS

## 2025-04-15 SDOH — SOCIAL STABILITY: SOCIAL INSECURITY: ARE YOU OR HAVE YOU BEEN THREATENED OR ABUSED PHYSICALLY, EMOTIONALLY, OR SEXUALLY BY ANYONE?: NO

## 2025-04-15 SDOH — ECONOMIC STABILITY: FOOD INSECURITY: HOW HARD IS IT FOR YOU TO PAY FOR THE VERY BASICS LIKE FOOD, HOUSING, MEDICAL CARE, AND HEATING?: NOT HARD AT ALL

## 2025-04-15 SDOH — HEALTH STABILITY: MENTAL HEALTH: HAVE YOU USED ANY SUBSTANCES (CANABIS, COCAINE, HEROIN, HALLUCINOGENS, INHALANTS, ETC.) IN THE PAST 12 MONTHS?: NO

## 2025-04-15 SDOH — SOCIAL STABILITY: SOCIAL INSECURITY: ARE THERE ANY APPARENT SIGNS OF INJURIES/BEHAVIORS THAT COULD BE RELATED TO ABUSE/NEGLECT?: NO

## 2025-04-15 SDOH — SOCIAL STABILITY: SOCIAL INSECURITY: ABUSE SCREEN: ADULT

## 2025-04-15 SDOH — SOCIAL STABILITY: SOCIAL INSECURITY: VERBAL ABUSE: DENIES

## 2025-04-15 SDOH — ECONOMIC STABILITY: HOUSING INSECURITY: DO YOU FEEL UNSAFE GOING BACK TO THE PLACE WHERE YOU ARE LIVING?: NO

## 2025-04-15 SDOH — SOCIAL STABILITY: SOCIAL INSECURITY: HAVE YOU HAD ANY THOUGHTS OF HARMING ANYONE ELSE?: NO

## 2025-04-15 SDOH — ECONOMIC STABILITY: TRANSPORTATION INSECURITY: IN THE PAST 12 MONTHS, HAS LACK OF TRANSPORTATION KEPT YOU FROM MEDICAL APPOINTMENTS OR FROM GETTING MEDICATIONS?: NO

## 2025-04-15 SDOH — HEALTH STABILITY: MENTAL HEALTH: WERE YOU ABLE TO COMPLETE ALL THE BEHAVIORAL HEALTH SCREENINGS?: NO

## 2025-04-15 SDOH — SOCIAL STABILITY: SOCIAL INSECURITY: DO YOU FEEL ANYONE HAS EXPLOITED OR TAKEN ADVANTAGE OF YOU FINANCIALLY OR OF YOUR PERSONAL PROPERTY?: NO

## 2025-04-15 SDOH — SOCIAL STABILITY: SOCIAL INSECURITY: HAVE YOU HAD THOUGHTS OF HARMING ANYONE ELSE?: NO

## 2025-04-15 ASSESSMENT — ACTIVITIES OF DAILY LIVING (ADL)
LACK_OF_TRANSPORTATION: NO
LACK_OF_TRANSPORTATION: NO

## 2025-04-15 ASSESSMENT — PAIN SCALES - GENERAL
PAINLEVEL_OUTOF10: 0 - NO PAIN
PAIN_LEVEL: 3
PAINLEVEL_OUTOF10: 0 - NO PAIN
PAINLEVEL_OUTOF10: 5 - MODERATE PAIN
PAINLEVEL_OUTOF10: 0 - NO PAIN

## 2025-04-15 ASSESSMENT — PATIENT HEALTH QUESTIONNAIRE - PHQ9
1. LITTLE INTEREST OR PLEASURE IN DOING THINGS: NOT AT ALL
SUM OF ALL RESPONSES TO PHQ9 QUESTIONS 1 & 2: 0
2. FEELING DOWN, DEPRESSED OR HOPELESS: NOT AT ALL

## 2025-04-15 ASSESSMENT — LIFESTYLE VARIABLES
AUDIT-C TOTAL SCORE: 0
SKIP TO QUESTIONS 9-10: 1
AUDIT-C TOTAL SCORE: 0
HOW MANY STANDARD DRINKS CONTAINING ALCOHOL DO YOU HAVE ON A TYPICAL DAY: PATIENT DOES NOT DRINK
HOW OFTEN DO YOU HAVE A DRINK CONTAINING ALCOHOL: NEVER
HOW OFTEN DO YOU HAVE 6 OR MORE DRINKS ON ONE OCCASION: NEVER

## 2025-04-15 ASSESSMENT — PAIN DESCRIPTION - LOCATION: LOCATION: ABDOMEN

## 2025-04-15 ASSESSMENT — PAIN SCALES - PAIN ASSESSMENT IN ADVANCED DEMENTIA (PAINAD): TOTALSCORE: MEDICATION (SEE MAR)

## 2025-04-15 NOTE — L&D DELIVERY NOTE
Vaginal Delivery Note    Patient Name: Jennifer Soares  : 1985  MRN: [id number]  Age: 40 y.o.    /Para:   Gestational Age: 40w0d    Date of Delivery: 4/15/2025    Procedure: Normal Spontaneous Vaginal Delivery    Delivery Provider: Jennifer      Description of Procedure:  Delivery of viable infant under epidural anesthesia. Delayed clamping was performed. The infant was placed skin to skin. Cord gases were not sent.  Cord blood was collected. Placenta delivered intact and fundus was firm    First degree Laceration identified and repaired.    Findings:   Amniotic fluid Clear, Male infant in Vertex Occiput Anterior presentation, APGARS 7 , 9 .  Birth Weight  .    Complications: None    Quantitative Blood Loss:   Delivery Blood Loss   Intrapartum & Postpartum: 04/15/25 0152 - 04/15/25 1421    Delivery Admission: 04/15/25 0152 - 04/15/25 1421         Intrapartum & Postpartum Delivery Admission    None               Blood products: None    Uterotonics/Hemostatic Agent: IV Pitocin 30 units    Specimen:   Placenta  Delivered: 4/15/2025  1:55 PM  Appearance: Intact  Removal: Spontaneous    Disposition: discarded    Sponge/Instrument/Needle Counts: The sponge, lap and needle counts were correct.    Patient Disposition: Patient recovering on labor and delivery in stable condition.    Additional Procedures:  None    KyrieziaSonia [[id number]]      Labor Events    Rupture date/time: 4/15/2025 1158  Rupture type: Artificial  Fluid color: Clear  Fluid odor: None  Labor type: Induced Onset of Labor  Labor allowed to proceed with plans for an attempted vaginal birth?: Yes  Induction: Oxytocin, AROM  Induction date/time: 4/15/2025 0906  Induction indications: Risk Reducing  Complications: None       Labor Event Times    Dilation complete date/time: 4/15/2025 1342  Start pushing date/time: 4/15/2025 1350       Placenta    Placenta delivery date/time: 4/15/2025 1355  Placenta removal: Spontaneous  Placenta  appearance: Intact  Placenta disposition: discarded       Cord    Vessels: 2 vessels  Complications: Nuchal  Nuchal intervention: reduced  Nuchal cord description: loose nuchal cord  Number of loops: 1  Delayed cord clamping?: Yes  Cord clamped date/time: 4/15/2025 13:54:00  Cord blood disposition: Lab  Gases sent?: No  Stem cell collection (by provider): No       Lacerations    Episiotomy: None  Perineal laceration: 1st  Other lacerations?: No  Repair suture: 3-0 Synthetic Suture       Anesthesia    Method: Epidural       Operative Delivery    Forceps attempted?: No  Vacuum extractor attempted?: No       Shoulder Dystocia    Shoulder dystocia present?: No       Frazer Delivery    Birth date/time: 4/15/2025 13:52:00  Delivery type: Vaginal, Spontaneous  Complications: None       Resuscitation    Method: Suctioning, Tactile stimulation       Apgars    Living status: Living  Apgar Component Scores:  1 min.:  5 min.:  10 min.:  15 min.:  20 min.:    Skin color:  0  1       Heart rate:  2  2       Reflex irritability:  2  2       Muscle tone:  2  2       Respiratory effort:  1  2       Total:  7  9       Apgars assigned by: SHRTUHI LUU       Delivery Providers    Delivering clinician: Jhony Rodriguez MD   Provider Role    Carolyn Medina, RN Delivery Nurse    Ramiro Anthony, RN Nursery Nurse    DELMY Lora Surgical Assistant    Saundra Zavala, SHRUTHI Delivery Assist

## 2025-04-15 NOTE — ANESTHESIA POSTPROCEDURE EVALUATION
"Patient: Jennifer Soares    Procedure Summary       Date: 04/15/25 Room / Location:     Anesthesia Start: 1300 Anesthesia Stop: 1352    Procedure: Labor Analgesia Diagnosis:     Scheduled Providers:  Responsible Provider: DEONDRE Mcgill    Anesthesia Type: epidural ASA Status: 2            Anesthesia Type: epidural  /55   Pulse 72   Temp 36.9 °C (98.4 °F) (Axillary)   Resp 17   Ht 1.778 m (5' 10\")   Wt 82.2 kg (181 lb 1.7 oz)   LMP 07/09/2024   SpO2 97%   Breastfeeding Unknown   BMI 25.99 kg/m²         Anesthesia Post Evaluation    Patient location during evaluation: bedside  Patient participation: complete - patient participated  Level of consciousness: awake and alert  Pain score: 3  Pain management: adequate  Airway patency: patent  Cardiovascular status: acceptable  Respiratory status: acceptable  Hydration status: acceptable  Postoperative Nausea and Vomiting: none  Comments: Neuraxial site assessed. No visible redness or swelling or drainage. Patient able to ambulate and move all extremities without difficulty. Will get up to void this coming hour. No complaints of nausea/vomiting. Tolerating PO intake well. No s/sx of PDPH.         No notable events documented.    "

## 2025-04-15 NOTE — ANESTHESIA PROCEDURE NOTES
Epidural Block    Patient location during procedure: OB  Start time: 4/15/2025 1:00 PM  End time: 4/15/2025 1:49 PM  Reason for block: labor analgesia  Staffing  Performed: CRNA   Authorized by: DEONDRE Mcgill    Performed by: DEONDRE Mcgill    Preanesthetic Checklist  Completed: patient identified, IV checked, risks and benefits discussed, surgical consent, pre-op evaluation, timeout performed and sterile techniques followed  Block Timeout  RN/Licensed healthcare professional reads aloud to the Anesthesia provider and entire team: Patient identity, procedure with side and site, patient position, and as applicable the availability of implants/special equipment/special requirements.  Patient on coagulant treatment: no  Timeout performed at: 4/15/2025 1:00 PM  Block Placement  Patient position: sitting  Prep: ChloraPrep  Sterility prep: cap, drape, gloves, hand and mask  Sedation level: no sedation  Patient monitoring: blood pressure and continuous pulse oximetry  Approach: midline  Local numbing: lidocaine 1% to skin and subcutaneous tissues  Vertebral space: lumbar  Lumbar location: L4-L5  Epidural  Loss of resistance technique: saline  Guidance: landmark technique        Needle  Needle type: Tuohy   Needle gauge: 19  Needle length: 8.9cm  Needle insertion depth: 6 cm  Catheter type: multi-orifice  Catheter size: 19 G  Catheter at skin depth: 12 cm  Catheter securement method: clear occlusive dressing and liquid medical adhesive    Test dose: lidocaine 1.5% with epinephrine 1-to-200,000  Test dose: lidocaine 1.5% with epinephrine 1-to-200,000  Test dose result: no positive test dose    PCEA  Medication concentration used: 0.2% Ropivacaine with 2 mcg/mL Fentanyl  Dose (mL): 5  Lockout (minutes): 30  1-Hour Limit (boluses/hr): 2  Basal Rate: 10        Assessment  Sensory level: other  Block outcome: patient comfortable  Number of attempts: 1  Events: no positive test dose  Procedure assessment:  patient tolerated procedure well with no immediate complications  Additional Notes  Pt tolerated procedure well, VSS throughout, atraumatic smooth insertion no CSF or blood aspirated, pt stated it felt midline. Pt laid down in LLD position and after 15 min pt was having increase in contrations and pain on right side, repositioned and given another bolus, some relief. Ob called to bedside to check pt. Will cont to monitor, Mom and baby's VSS.

## 2025-04-15 NOTE — ANESTHESIA PREPROCEDURE EVALUATION
Patient: Jennifer Soares    Evaluation Method: In-person visit    Procedure Information    Date: 04/15/25  Procedure: Labor Consult         Relevant Problems   Anesthesia (within normal limits)      Cardiac (within normal limits)      Pulmonary (within normal limits)      Neuro (within normal limits)      GI   (+) Gastroesophageal reflux disease without esophagitis (During pregnancy )      /Renal (within normal limits)      Liver (within normal limits)      Endocrine (within normal limits)      Hematology (within normal limits)      Musculoskeletal (within normal limits)      HEENT (within normal limits)      ID (within normal limits)      Skin (within normal limits)      GYN   (+) Antepartum multigravida of advanced maternal age ([facility])   (+) Encounter for pregnancy related examination, antepartum       Clinical information reviewed:   Tobacco  Allergies  Meds   Med Hx  Surg Hx   Fam Hx          NPO Detail:  No data recorded     OB/Gyn Evaluation    Present Pregnancy    Patient is pregnant now.   Obstetric History                Physical Exam    Airway  Mallampati: II  TM distance: >3 FB  Neck ROM: full     Cardiovascular - normal exam     Dental - normal exam     Pulmonary - normal exam     Abdominal - normal exam             Anesthesia Plan    History of general anesthesia?: yes  History of complications of general anesthesia?: no    ASA 2     epidural     The patient is not a current smoker.    Anesthetic plan and risks discussed with patient.  Use of blood products discussed with patient who consented to blood products.    Plan discussed with CRNA.

## 2025-04-15 NOTE — ANESTHESIA PREPROCEDURE EVALUATION
Patient: Jennifer Soares    Evaluation Method: In-person visit    Procedure Information    Date: 04/15/25  Procedure: Labor Consult         Relevant Problems   Anesthesia (within normal limits)      Cardiac (within normal limits)      Pulmonary (within normal limits)      Neuro (within normal limits)      GI   (+) Gastroesophageal reflux disease without esophagitis (During pregnancy )      /Renal (within normal limits)      Liver (within normal limits)      Endocrine (within normal limits)      Hematology (within normal limits)      Musculoskeletal (within normal limits)      HEENT (within normal limits)      ID (within normal limits)      Skin (within normal limits)      GYN   (+) 39 weeks gestation of pregnancy ([facility])   (+) Antepartum multigravida of advanced maternal age ([facility])   (+) Encounter for pregnancy related examination, antepartum       Clinical information reviewed:   Tobacco  Allergies  Meds   Med Hx  Surg Hx   Fam Hx          NPO Detail:  No data recorded     OB/Gyn Evaluation    Present Pregnancy    Patient is pregnant now.   Obstetric History                Physical Exam    Airway  Mallampati: II  TM distance: >3 FB  Neck ROM: full     Cardiovascular - normal exam     Dental - normal exam     Pulmonary - normal exam     Abdominal - normal exam             Anesthesia Plan    History of general anesthesia?: yes  History of complications of general anesthesia?: no    ASA 2     epidural     The patient is not a current smoker.    Anesthetic plan and risks discussed with patient.  Use of blood products discussed with patient who consented to blood products.    Plan discussed with CRNA.

## 2025-04-15 NOTE — H&P
OB Admission H&P    Assessment/Plan    Jennifer Soares is a 40 y.o.  at 40w0d, [place]: 4/15/2025, by Last Menstrual Period, who presents for Induction of Labor.  -CBC, type and screen  -IVF, CEFM  -Start pitocin per protocol  -GBS negative    Pregnancy Problems (from 24 to present)       Problem Noted Diagnosed Resolved    39 weeks gestation of pregnancy ([facility]) 4/15/2025 by Jhony Rodriguez MD  No    Priority:  Medium       Single umbilical artery ([facility]) 2024 by ASHANTI Shetty  No    Priority:  Medium       Overview Addendum 2025 11:46 AM by Jhony Rodriguez MD     US for growth:  Amenable to one US in 3rd trimester, but unsure about two  30w EFW 64%, normal interval growth         Family history of congenital heart defect 2024 by ASHANTI Shetty  No    Priority:  Medium       Overview Addendum 2024  8:58 PM by Jhony Rodriguez MD     FOB with history of heart defect, had surgery in childhood  Fetal echo normal         Encounter for pregnancy related examination, antepartum 2024 by ASHANTI Shetty  No    Priority:  Medium       Overview Addendum 2025  9:50 AM by Jhony Rodriguez MD     [x] Blood Products: [x] Yes, accepts [] No, needs counseling  [x] Initial BMI: 21.67   [x] Prenatal Labs: wnl except for rubella non immune status  [x] Cervical Cancer Screening up to date: next due 2029  [x] Rh status: positive  [x] Genetic Screening:  rrcfDNA screen  [x] It's a surprise!  [x] NT US: (11-13 wks): 1.7mm  [x] Baby ASA:  Yes  [x] Pregnancy dated by: LMP = 9w3d scan    [x] Anatomy US: (19-20 wks) SUA otherwise normal  [x] 1hr GCT at 24-28wks: Normal  [x] Tdap: Done  [] RSV (32-36 wks) (Sept. to end of ):   [x] Flu Vaccine: Done    [x] Breastfeeding: Yes  [] Postpartum Birth control method:   [x] GBS at 36 - 37 wks: Negative  [x] 39 weeks discussion of IOL vs. Expectant management: Hoping for spontaneous labor  [x] Mode of delivery ( anticipated ):  Vaginal           Antepartum multigravida of advanced maternal age ([facility]) 2024 by ASHANTI Shetty  No    Priority:  Medium       Overview Signed 10/15/2024 11:58 AM by Jhony Rodriguez MD     NT wnl           Not immune to rubella 2024 by ASHANTI Shetty  No    Priority:  Medium       Overview Signed 2024  4:53 PM by ASHANTI Shetty     Offer MMR postpartum                 Subjective   This is a 39yo  at 40w who presents for scheduled induction of labor.      Prenatal Provider Jennifer    OB History    Para Term  AB Living   2 1 1 0 0 1   SAB IAB Ectopic Multiple Live Births   0 0 0 0 1      # Outcome Date GA Lbr Norberto/2nd Weight Sex Type Anes PTL Lv   2 Current            1 Term 23   3.629 kg F Vag-Spont EPI  RAFFY       History reviewed. No pertinent surgical history.    Social History     Tobacco Use    Smoking status: Former    Smokeless tobacco: Never   Substance Use Topics    Alcohol use: Not Currently       Allergies   Allergen Reactions    Penicillins Unknown       Medications Prior to Admission   Medication Sig Dispense Refill Last Dose/Taking    omeprazole (PriLOSEC) 20 mg DR capsule Take 1 capsule (20 mg) by mouth once daily. Do not crush or chew. 90 capsule 3 2025    ondansetron (Zofran) 8 mg tablet Take 1 tablet (8 mg) by mouth every 8 hours if needed for nausea. 30 tablet 1 Past Week    prenatal no115/iron/folic acid (PRENATAL 19 ORAL) Take by mouth.   2025    doxylamine (Unisom, doxylamine,) 25 mg tablet Take 0.5 tablets (12.5 mg) by mouth as needed at bedtime for nausea. 30 tablet 3      Objective     Last Vitals  Temp Pulse Resp BP MAP O2 Sat    Afebrile 73  18 108/69 84 99 %     Blood Pressures         4/15/2025  0856             BP: 108/69             Physical Exam  Gen:  Alert, oriented, well-nourished, NAD  Pulm:  Normal respiratory effort  Abd:  Gravid, soft, nontender  Obstetrics  FHTs:  130s, moderate variability,  +accels, no decels  TOCO:  irregular  Cervix: 3-4/70/-3  BSUS:  Cephalic  Ext:  Trace edema, no calf tenderness  Neuro:  Grossly intact

## 2025-04-15 NOTE — SIGNIFICANT EVENT
Pt feeling contractions, but not painful yet  FHTs: 140s, moderate variability, +accels, no decels  TOCO: q2-3min  Cervix:  4/70/-2  AROM clear  Continue pitocin  Epidural upon request  GBS negative

## 2025-04-16 PROCEDURE — 2500000001 HC RX 250 WO HCPCS SELF ADMINISTERED DRUGS (ALT 637 FOR MEDICARE OP): Performed by: OBSTETRICS & GYNECOLOGY

## 2025-04-16 PROCEDURE — 1100000001 HC PRIVATE ROOM DAILY

## 2025-04-16 PROCEDURE — 2500000004 HC RX 250 GENERAL PHARMACY W/ HCPCS (ALT 636 FOR OP/ED): Performed by: OBSTETRICS & GYNECOLOGY

## 2025-04-16 PROCEDURE — 2500000001 HC RX 250 WO HCPCS SELF ADMINISTERED DRUGS (ALT 637 FOR MEDICARE OP): Performed by: ADVANCED PRACTICE MIDWIFE

## 2025-04-16 RX ORDER — IBUPROFEN 600 MG/1
600 TABLET ORAL EVERY 6 HOURS PRN
Qty: 30 TABLET | Refills: 2 | Status: SHIPPED | OUTPATIENT
Start: 2025-04-16

## 2025-04-16 RX ADMIN — IBUPROFEN 600 MG: 600 TABLET ORAL at 21:16

## 2025-04-16 RX ADMIN — ACETAMINOPHEN 975 MG: 325 TABLET, FILM COATED ORAL at 04:35

## 2025-04-16 RX ADMIN — ACETAMINOPHEN 975 MG: 325 TABLET, FILM COATED ORAL at 10:42

## 2025-04-16 RX ADMIN — ACETAMINOPHEN 975 MG: 325 TABLET, FILM COATED ORAL at 21:16

## 2025-04-16 RX ADMIN — IBUPROFEN 600 MG: 600 TABLET ORAL at 16:38

## 2025-04-16 RX ADMIN — IBUPROFEN 600 MG: 600 TABLET ORAL at 04:35

## 2025-04-16 RX ADMIN — POLYETHYLENE GLYCOL 3350 17 G: 17 POWDER, FOR SOLUTION ORAL at 10:42

## 2025-04-16 RX ADMIN — IBUPROFEN 600 MG: 600 TABLET ORAL at 10:42

## 2025-04-16 RX ADMIN — ACETAMINOPHEN 975 MG: 325 TABLET, FILM COATED ORAL at 16:38

## 2025-04-16 RX ADMIN — POLYETHYLENE GLYCOL 3350 17 G: 17 POWDER, FOR SOLUTION ORAL at 21:16

## 2025-04-16 RX ADMIN — SALINE NASAL SPRAY 1 SPRAY: 1.5 SOLUTION NASAL at 13:02

## 2025-04-16 ASSESSMENT — PAIN SCALES - PAIN ASSESSMENT IN ADVANCED DEMENTIA (PAINAD)
TOTALSCORE: MEDICATION (SEE MAR)
TOTALSCORE: MEDICATION (SEE MAR)

## 2025-04-16 ASSESSMENT — PAIN DESCRIPTION - LOCATION
LOCATION: BUTTOCKS
LOCATION: ABDOMEN

## 2025-04-16 ASSESSMENT — PAIN SCALES - GENERAL
PAINLEVEL_OUTOF10: 0 - NO PAIN
PAINLEVEL_OUTOF10: 1
PAINLEVEL_OUTOF10: 2
PAINLEVEL_OUTOF10: 3
PAINLEVEL_OUTOF10: 1

## 2025-04-16 NOTE — CARE PLAN
Problem: Vaginal Birth or  Section  Goal: Fetal and maternal status remain reassuring during the birth process  Outcome: Met  Goal: Tolerate CRB for IOL placement maintenance until dislodgement/removal 12hrs after placement  Outcome: Met  Goal: Prevention of malpresentation/labor dystocia through delivery  Outcome: Met  Goal: Demonstrates labor coping techniques through delivery  Outcome: Met  Goal: Minimal s/sx of HDP and BP<160/110  Outcome: Met  Goal: No s/sx of infection through recovery  Outcome: Met  Goal: No s/sx of hemorrhage through recovery  Outcome: Met     Problem: Postpartum  Goal: Experiences normal postpartum course  Outcome: Progressing  Goal: Appropriate maternal -  bonding  Outcome: Progressing  Goal: Establish and maintain infant feeding pattern for adequate nutrition  Outcome: Progressing  Goal: Incisions, wounds, or drain sites healing without S/S of infection  Outcome: Progressing  Goal: No s/sx infection  Outcome: Progressing  Goal: No s/sx of hemorrhage  Outcome: Progressing  Goal: Minimal s/sx of HDP and BP<160/110  Outcome: Progressing     Problem: UH VAGINAL BLEEDING/HEMORRHAGE AP  Goal: Fewer then 4-6 ct per hour  Outcome: Progressing  Goal: No s/sx of hemorrhage  Outcome: Progressing     Problem: Postpartum hemorrhage  Goal: Hemodynamic stability and limit blood loss  Outcome: Progressing     Problem: Pain - Adult  Goal: Verbalizes/displays adequate comfort level or baseline comfort level  Outcome: Progressing     Problem: Safety - Adult  Goal: Free from fall injury  Outcome: Progressing     Problem: Discharge Planning  Goal: Discharge to home or other facility with appropriate resources  Outcome: Progressing

## 2025-04-16 NOTE — PROGRESS NOTES
Postpartum Progress Note    Assessment/Plan   Jennifer Soares is a 40 y.o., , who delivered at 40w0d gestation and is now postpartum day 1.    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[facility]) 2024 by ASHANTI Shetty  No    Priority:  Medium       Overview Addendum 2025 11:46 AM by Jhony Rodriguez MD   US for growth:  Amenable to one US in 3rd trimester, but unsure about two  30w EFW 64%, normal interval growth         Family history of congenital heart defect 2024 by ASHANTI Shetty  No    Priority:  Medium       Overview Addendum 2024  8:58 PM by Jhony Rodriguez MD   FOB with history of heart defect, had surgery in childhood  Fetal echo normal         Encounter for pregnancy related examination, antepartum 2024 by ASHANTI Shetty  No    Priority:  Medium       Overview Addendum 2025  9:50 AM by Jhony Rodriguez MD   [x] Blood Products: [x] Yes, accepts [] No, needs counseling  [x] Initial BMI: 21.67   [x] Prenatal Labs: wnl except for rubella non immune status  [x] Cervical Cancer Screening up to date: next due 2029  [x] Rh status: positive  [x] Genetic Screening:  rrcfDNA screen  [x] It's a surprise!  [x] NT US: (11-13 wks): 1.7mm  [x] Baby ASA:  Yes  [x] Pregnancy dated by: LMP = 9w3d scan    [x] Anatomy US: (19-20 wks) SUA otherwise normal  [x] 1hr GCT at 24-28wks: Normal  [x] Tdap: Done  [] RSV (32-36 wks) (Sept. to end of ):   [x] Flu Vaccine: Done    [x] Breastfeeding: Yes  [] Postpartum Birth control method:   [x] GBS at 36 - 37 wks: Negative  [x] 39 weeks discussion of IOL vs. Expectant management: Hoping for spontaneous labor  [x] Mode of delivery (  anticipated ): Vaginal           Antepartum multigravida of advanced maternal age ([facility]) 8/29/2024 by ASHANTI Shetty  No    Priority:  Medium       Overview Signed 10/15/2024 11:58 AM by Jhony Rodriguez MD   NT wnl           Not immune to rubella 8/29/2024 by ASHANTI Shetty  No    Priority:  Medium       Overview Signed 8/29/2024  4:53 PM by ASHANTI Shetty   Offer MMR postpartum               Hospital course: no complications  Vaginal Birth  Patient is currently breastfeedingThe patient's blood type is O POS. The baby's blood type is O POS. Rhogam is not indicated.    Subjective   Her pain is well controlled with current medications  She is passing flatus  She is ambulating well  She is tolerating a Adult diet Regular  She reports no breast or nursing problems  She denies emotional concerns today   Her plan for contraception is none         Objective   Allergies:   Penicillins         Last Vitals:  Temp Pulse Resp BP MAP Pulse Ox   36.9 °C (98.4 °F) 78 16 111/72 79 97 %     Vitals Min/Max Last 24 Hours:  Temp  Min: 36.4 °C (97.5 °F)  Max: 37.4 °C (99.4 °F)  Pulse  Min: 69  Max: 118  Resp  Min: 16  Max: 17  BP  Min: 96/55  Max: 131/84  MAP (mmHg)  Min: 66  Max: 100    Intake/Output:     Intake/Output Summary (Last 24 hours) at 4/16/2025 0914  Last data filed at 4/15/2025 2030  Gross per 24 hour   Intake 1944.4 ml   Output 1428 ml   Net 516.4 ml       Physical Exam:  General: Examination reveals a well developed, well nourished, female, in no acute distress. She is alert and cooperative.  Fundus: firm and nontender.  Extremities: no redness or tenderness in the calves or thighs, no edema.    Chaperone Present: Yes.  Chaperone Name/Title: JAYLON Gan student  Examination Chaperoned: Entire Physical Exam    Lab Data:  Labs in chart were reviewed.

## 2025-04-16 NOTE — LACTATION NOTE
Lactation Consultant Note  Lactation Consultation  Reason for Consult: Initial assessment  Consultant Name: Nasreen TEE    Maternal Information  Has mother  before?: Yes  How long did the mother previously breastfeed?: 9 months  Infant to breast within first 2 hours of birth?: No  Exclusive Pump and Bottle Feed: Yes    Maternal Assessment  Breast Assessment: Large, Soft, Compressible  Nipple Assessment: Intact, Erect  Areola Assessment: Normal    Infant Assessment  Infant Behavior: Awake  Infant Assessment:  (deferred)    Feeding Assessment  Nutrition Source: Breastmilk  Feeding Method: Nursing at the breast  Feeding Position: Cradle  Suck/Feeding: Sustained, Tactile stimulation needed  Latch Assessment: Minimal assistance is needed, Instructed on deep latch, Deep latch obtained, Latch achieved after repeated attempts    LATCH TOOL  Latch: Repeated attempts, hold nipple in mouth, stimulate to suck  Audible Swallowing: A few with stimulation  Type of Nipple: Everted (After stimulation)  Comfort (Breast/Nipple): Soft/non-tender  Hold (Positioning): Minimal assist, teach one side, mother does other, staff holds  LATCH Score: 7    Breast Pump       Other OB Lactation Tools       Patient Follow-up  Inpatient Lactation Follow-up Needed : No  Lactation Professional - OK to Discharge: Yes    Other OB Lactation Documentation       Recommendations/Summary  Observed mom latch baby on to the left side in cradle hold. Baby was sleepy and not interested in feeding initially. However, Baby did latch and feed for 5 minutes with encouragement. Baby appeared to be latched deeply. Reviewed milk supply patterns and  feeding patterns in the first and second 24 HOL. Mom was asked to attempt/feed baby at least every 2-3 hours or on demand with a goal of 8-12 feeds daily. Feeding cues reviewed.Breastfeeding education reviewed and questions answered. Mom aware of lactation support and asked to call out for feed assistance and  with questions as needed.

## 2025-04-17 VITALS
WEIGHT: 181.11 LBS | OXYGEN SATURATION: 99 % | SYSTOLIC BLOOD PRESSURE: 111 MMHG | TEMPERATURE: 97.2 F | HEART RATE: 72 BPM | HEIGHT: 70 IN | RESPIRATION RATE: 18 BRPM | BODY MASS INDEX: 25.93 KG/M2 | DIASTOLIC BLOOD PRESSURE: 79 MMHG

## 2025-04-17 PROCEDURE — 2500000001 HC RX 250 WO HCPCS SELF ADMINISTERED DRUGS (ALT 637 FOR MEDICARE OP): Performed by: OBSTETRICS & GYNECOLOGY

## 2025-04-17 RX ADMIN — ACETAMINOPHEN 975 MG: 325 TABLET, FILM COATED ORAL at 04:12

## 2025-04-17 RX ADMIN — IBUPROFEN 600 MG: 600 TABLET ORAL at 10:26

## 2025-04-17 RX ADMIN — ACETAMINOPHEN 975 MG: 325 TABLET, FILM COATED ORAL at 10:25

## 2025-04-17 RX ADMIN — IBUPROFEN 600 MG: 600 TABLET ORAL at 04:12

## 2025-04-17 ASSESSMENT — PAIN SCALES - GENERAL
PAINLEVEL_OUTOF10: 1
PAINLEVEL_OUTOF10: 2

## 2025-04-17 ASSESSMENT — PAIN DESCRIPTION - LOCATION: LOCATION: BREAST

## 2025-04-17 ASSESSMENT — PAIN DESCRIPTION - DESCRIPTORS: DESCRIPTORS: CRAMPING

## 2025-04-17 ASSESSMENT — PAIN SCALES - PAIN ASSESSMENT IN ADVANCED DEMENTIA (PAINAD): TOTALSCORE: MEDICATION (SEE MAR)

## 2025-04-17 NOTE — CARE PLAN
The patient's goals for the shift include      The clinical goals for the shift include rest and bond with baby

## 2025-04-24 NOTE — DISCHARGE SUMMARY
Discharge Summary    Admission Date: 4/15/2025  Discharge Date: 4/16/2025    Discharge Diagnosis  39 weeks gestation of pregnancy ([facility])    Hospital Course  Delivery Date: 4/15/2025 1:52 PM  Delivery type: Vaginal, Spontaneous   GA at delivery: 40w0d  Outcome: Living  Anesthesia during delivery: Epidural  Intrapartum complications: None  Feeding method: Breastfeeding Status: Yes     Procedures:     Contraception at discharge: none      Pertinent Physical Exam At Time of Discharge       Last Vitals:  Temp Pulse Resp BP MAP Pulse Ox   36.2 °C (97.2 °F) 72 18 111/79 87 99 %     Discharge Meds     Your medication list        START taking these medications        Instructions Last Dose Given Next Dose Due   ibuprofen 600 mg tablet      Take 1 tablet (600 mg) by mouth every 6 hours if needed for mild pain (1 - 3).              CONTINUE taking these medications        Instructions Last Dose Given Next Dose Due   PRENATAL 19 ORAL                  STOP taking these medications      doxylamine 25 mg tablet  Commonly known as: Unisom (doxylamine)        omeprazole 20 mg DR capsule  Commonly known as: PriLOSEC        ondansetron 8 mg tablet  Commonly known as: Zofran                  Where to Get Your Medications        These medications were sent to [facility]pharmacy #[id number] - [facility] [place] - [address] [address] [facility]  [address] [address] [address]      Phone: [phone]   ibuprofen 600 mg tablet          Complications Requiring Follow-Up  none    Test Results Pending At Discharge  Pending Labs       No current pending labs.            Outpatient Follow-Up  Future Appointments   Date Time Provider Department Center   5/29/2025  9:30 AM Jhony Rodriguez MD [id number]       I spent  minutes in the professional and overall care of this patient.      Melissa Jaramillo MD

## 2025-05-29 ENCOUNTER — APPOINTMENT (OUTPATIENT)
Dept: OBSTETRICS AND GYNECOLOGY | Facility: CLINIC | Age: 40
End: 2025-05-29
Payer: COMMERCIAL

## 2025-05-29 VITALS
WEIGHT: 155 LBS | HEIGHT: 70 IN | DIASTOLIC BLOOD PRESSURE: 68 MMHG | BODY MASS INDEX: 22.19 KG/M2 | SYSTOLIC BLOOD PRESSURE: 105 MMHG

## 2025-05-29 PROCEDURE — 0503F POSTPARTUM CARE VISIT: CPT | Performed by: OBSTETRICS & GYNECOLOGY

## 2025-05-29 ASSESSMENT — EDINBURGH POSTNATAL DEPRESSION SCALE (EPDS)
THE THOUGHT OF HARMING MYSELF HAS OCCURRED TO ME: NEVER
I HAVE BEEN ANXIOUS OR WORRIED FOR NO GOOD REASON: NO, NOT AT ALL
I HAVE FELT SAD OR MISERABLE: NO, NOT AT ALL
I HAVE FELT SCARED OR PANICKY FOR NO GOOD REASON: NO, NOT AT ALL
I HAVE BEEN ABLE TO LAUGH AND SEE THE FUNNY SIDE OF THINGS: AS MUCH AS I ALWAYS COULD
I HAVE BEEN SO UNHAPPY THAT I HAVE HAD DIFFICULTY SLEEPING: NOT AT ALL
THINGS HAVE BEEN GETTING ON TOP OF ME: NO, I HAVE BEEN COPING AS WELL AS EVER
I HAVE BEEN SO UNHAPPY THAT I HAVE BEEN CRYING: NO, NEVER
TOTAL SCORE: 0
I HAVE BLAMED MYSELF UNNECESSARILY WHEN THINGS WENT WRONG: NO, NEVER
I HAVE LOOKED FORWARD WITH ENJOYMENT TO THINGS: AS MUCH AS I EVER DID

## 2025-09-17 ENCOUNTER — APPOINTMENT (OUTPATIENT)
Dept: OBSTETRICS AND GYNECOLOGY | Facility: CLINIC | Age: 40
End: 2025-09-17
Payer: COMMERCIAL